# Patient Record
Sex: MALE | Race: WHITE | NOT HISPANIC OR LATINO | Employment: FULL TIME | URBAN - METROPOLITAN AREA
[De-identification: names, ages, dates, MRNs, and addresses within clinical notes are randomized per-mention and may not be internally consistent; named-entity substitution may affect disease eponyms.]

---

## 2023-09-16 ENCOUNTER — APPOINTMENT (OUTPATIENT)
Dept: PRIMARY CARE | Facility: CLINIC | Age: 38
End: 2023-09-16
Payer: COMMERCIAL

## 2023-09-18 ENCOUNTER — OFFICE VISIT (OUTPATIENT)
Dept: PRIMARY CARE | Facility: CLINIC | Age: 38
End: 2023-09-18
Payer: COMMERCIAL

## 2023-09-18 ENCOUNTER — APPOINTMENT (OUTPATIENT)
Dept: PRIMARY CARE | Facility: CLINIC | Age: 38
End: 2023-09-18
Payer: COMMERCIAL

## 2023-09-18 VITALS
OXYGEN SATURATION: 96 % | BODY MASS INDEX: 44.1 KG/M2 | HEART RATE: 88 BPM | HEIGHT: 71 IN | WEIGHT: 315 LBS | SYSTOLIC BLOOD PRESSURE: 140 MMHG | TEMPERATURE: 97 F | DIASTOLIC BLOOD PRESSURE: 99 MMHG

## 2023-09-18 DIAGNOSIS — I10 BENIGN ESSENTIAL HYPERTENSION: ICD-10-CM

## 2023-09-18 PROCEDURE — 99203 OFFICE O/P NEW LOW 30 MIN: CPT | Performed by: NURSE PRACTITIONER

## 2023-09-18 PROCEDURE — 3080F DIAST BP >= 90 MM HG: CPT | Performed by: NURSE PRACTITIONER

## 2023-09-18 PROCEDURE — 1036F TOBACCO NON-USER: CPT | Performed by: NURSE PRACTITIONER

## 2023-09-18 PROCEDURE — 3077F SYST BP >= 140 MM HG: CPT | Performed by: NURSE PRACTITIONER

## 2023-09-18 RX ORDER — HYDROCHLOROTHIAZIDE 12.5 MG/1
25 TABLET ORAL DAILY
COMMUNITY
Start: 2023-09-09 | End: 2023-10-02 | Stop reason: DRUGHIGH

## 2023-09-18 RX ORDER — LISINOPRIL 10 MG/1
20 TABLET ORAL 2 TIMES DAILY
COMMUNITY
Start: 2023-08-25 | End: 2023-10-02 | Stop reason: DRUGHIGH

## 2023-09-18 ASSESSMENT — ENCOUNTER SYMPTOMS
SORE THROAT: 1
PALPITATIONS: 0
ARTHRALGIAS: 0
NERVOUS/ANXIOUS: 0
EYE PAIN: 0
NAUSEA: 0
HEMATURIA: 0
BACK PAIN: 0
PHOTOPHOBIA: 0
CHILLS: 0
MYALGIAS: 0
UNEXPECTED WEIGHT CHANGE: 0
FEVER: 0
VOMITING: 0
EYE REDNESS: 0
CHEST TIGHTNESS: 0
WHEEZING: 0
CONSTIPATION: 0
EYE ITCHING: 0
DYSURIA: 0
EYE DISCHARGE: 0
ADENOPATHY: 0
WEAKNESS: 0
BRUISES/BLEEDS EASILY: 0
RHINORRHEA: 0
DIARRHEA: 0
SINUS PRESSURE: 0
HEADACHES: 1
DIZZINESS: 0
COUGH: 0
POLYPHAGIA: 0
SLEEP DISTURBANCE: 0
BLOOD IN STOOL: 0
DIFFICULTY URINATING: 0
FATIGUE: 0
SPEECH DIFFICULTY: 0
SHORTNESS OF BREATH: 0
NUMBNESS: 0
DYSPHORIC MOOD: 0
POLYDIPSIA: 0
FREQUENCY: 0
NECK PAIN: 0
HYPERTENSION: 1
ABDOMINAL PAIN: 0

## 2023-09-18 NOTE — PROGRESS NOTES
Subjective   Ilya Olivas is a 37 y.o. male who presents for Hypertension (Talk about high BP ), Sinusitis (Chronic sinus drainage discuss med...used OTC and not working), and Flu Vaccine (Patient asked/declined).    Hypertension  Associated symptoms include headaches. Pertinent negatives include no chest pain, neck pain, palpitations or shortness of breath.   Sinusitis  Associated symptoms include headaches and a sore throat. Pertinent negatives include no chills, congestion, coughing, ear pain, neck pain, shortness of breath or sinus pressure.     He presents to the office today to establish care and follow up BP.   He reports he can feel a tightness in chest and feels heart beating in neck when BP elevated.  No chest pain, palpitations or edema.  (+)SOB when walking around when first started the BP medication. This is getting better now that he is getting used to the medication  Drank 1 energy drink a day prior to ER visit.  Tolerating medications well at current dose- no side effects. Increased urination with increased dose of hydrochlorothiazide.  No headaches, numbness, tingling, weakness or vision changes.  No dizziness.  BP readings in the /117  Had a workup in the ER. Then has been going to the [facility] who has been titrating medication. 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  Eats a lot of hot dogs to snack on throughout the day.  Exercise: No scheduled exercise.     Had DOT physical in a month and needs to get his BP down by then.    Review of Systems   Constitutional:  Negative for chills, fatigue, fever and unexpected weight change.   HENT:  Positive for sore throat and tinnitus. Negative for congestion, ear pain, hearing loss, nosebleeds, postnasal drip, rhinorrhea and sinus pressure.    Eyes:  Negative for photophobia, pain, discharge, redness, itching and visual disturbance.   Respiratory:  Negative for cough, chest tightness,  "shortness of breath and wheezing.    Cardiovascular:  Negative for chest pain, palpitations and leg swelling.   Gastrointestinal:  Negative for abdominal pain, blood in stool, constipation, diarrhea, nausea and vomiting.   Endocrine: Negative for cold intolerance, heat intolerance, polydipsia, polyphagia and polyuria.   Genitourinary:  Negative for difficulty urinating, dysuria, frequency, hematuria and urgency.   Musculoskeletal:  Negative for arthralgias, back pain, myalgias and neck pain.   Skin:  Negative for rash.   Neurological:  Positive for headaches. Negative for dizziness, syncope, speech difficulty, weakness and numbness.   Hematological:  Negative for adenopathy. Does not bruise/bleed easily.   Psychiatric/Behavioral:  Negative for dysphoric mood and sleep disturbance. The patient is not nervous/anxious.      Objective   BP (!) 140/99   Pulse 88   Temp 36.1 °C (97 °F)   Ht 1.791 m (5' 10.5\")   Wt (!) 189 kg (416 lb 3.2 oz)   SpO2 96%   BMI 58.87 kg/m²     Physical Exam  Constitutional:       General: He is not in acute distress.     Appearance: Normal appearance. He is not toxic-appearing.   Eyes:      Extraocular Movements: Extraocular movements intact.      Conjunctiva/sclera: Conjunctivae normal.      Pupils: Pupils are equal, round, and reactive to light.   Neck:      Vascular: No carotid bruit.   Cardiovascular:      Rate and Rhythm: Normal rate and regular rhythm.      Pulses: Normal pulses.      Heart sounds: Normal heart sounds, S1 normal and S2 normal. No murmur heard.  Pulmonary:      Effort: Pulmonary effort is normal. No respiratory distress.      Breath sounds: Normal breath sounds.   Abdominal:      General: Bowel sounds are normal.      Palpations: Abdomen is soft.      Tenderness: There is no abdominal tenderness.   Musculoskeletal:      Right lower leg: No edema.      Left lower leg: No edema.   Lymphadenopathy:      Cervical: No cervical adenopathy.   Neurological:      Mental " Status: He is alert and oriented to person, place, and time.   Psychiatric:         Attention and Perception: Attention normal.         Mood and Affect: Mood and affect normal.         Behavior: Behavior normal. Behavior is cooperative.         Thought Content: Thought content normal.         Cognition and Memory: Cognition normal.         Judgment: Judgment normal.       Assessment/Plan   Problem List Items Addressed This Visit       Benign essential hypertension     Given he recently doubled the dose of both medications, will continue with current dose for now. Plan to recheck a BMP and follow up in 2 weeks to add medication if needed.         Relevant Orders    Basic Metabolic Panel       It has been a pleasure seeing you today!

## 2023-09-18 NOTE — PATIENT INSTRUCTIONS
Focus on a low sodium/low fat diet (whole grains, fresh fruits and vegetables, lean meats). Avoid foods high in sugar.  Increase exercise as tolerated.  Continue medications at the current dose.  Send my BP readings through [employer] at the end of the week.   Check blood work this weekend when home.  Follow up as planned in 2 weeks.

## 2023-09-20 NOTE — ASSESSMENT & PLAN NOTE
Given he recently doubled the dose of both medications, will continue with current dose for now. Plan to recheck a BMP and follow up in 2 weeks to add medication if needed.  Discussed focusing on a low sodium diet.

## 2023-09-23 ENCOUNTER — LAB (OUTPATIENT)
Dept: LAB | Facility: LAB | Age: 38
End: 2023-09-23
Payer: COMMERCIAL

## 2023-09-23 DIAGNOSIS — I10 BENIGN ESSENTIAL HYPERTENSION: ICD-10-CM

## 2023-09-23 LAB
ANION GAP IN SER/PLAS: 11 MMOL/L (ref 10–20)
CALCIUM (MG/DL) IN SER/PLAS: 8.8 MG/DL (ref 8.6–10.3)
CARBON DIOXIDE, TOTAL (MMOL/L) IN SER/PLAS: 26 MMOL/L (ref 21–32)
CHLORIDE (MMOL/L) IN SER/PLAS: 105 MMOL/L (ref 98–107)
CREATININE (MG/DL) IN SER/PLAS: 1.05 MG/DL (ref 0.5–1.3)
GFR MALE: >90 ML/MIN/1.73M2
GLUCOSE (MG/DL) IN SER/PLAS: 85 MG/DL (ref 74–99)
POTASSIUM (MMOL/L) IN SER/PLAS: 4.4 MMOL/L (ref 3.5–5.3)
SODIUM (MMOL/L) IN SER/PLAS: 138 MMOL/L (ref 136–145)
UREA NITROGEN (MG/DL) IN SER/PLAS: 16 MG/DL (ref 6–23)

## 2023-09-23 PROCEDURE — 36415 COLL VENOUS BLD VENIPUNCTURE: CPT

## 2023-09-23 PROCEDURE — 80048 BASIC METABOLIC PNL TOTAL CA: CPT

## 2023-10-02 ENCOUNTER — OFFICE VISIT (OUTPATIENT)
Dept: PRIMARY CARE | Facility: CLINIC | Age: 38
End: 2023-10-02
Payer: COMMERCIAL

## 2023-10-02 VITALS
HEART RATE: 85 BPM | BODY MASS INDEX: 58.73 KG/M2 | WEIGHT: 315 LBS | SYSTOLIC BLOOD PRESSURE: 120 MMHG | DIASTOLIC BLOOD PRESSURE: 86 MMHG | TEMPERATURE: 97.9 F | OXYGEN SATURATION: 96 %

## 2023-10-02 DIAGNOSIS — I10 BENIGN ESSENTIAL HYPERTENSION: Primary | ICD-10-CM

## 2023-10-02 PROCEDURE — 99213 OFFICE O/P EST LOW 20 MIN: CPT | Performed by: NURSE PRACTITIONER

## 2023-10-02 PROCEDURE — 3074F SYST BP LT 130 MM HG: CPT | Performed by: NURSE PRACTITIONER

## 2023-10-02 PROCEDURE — 3079F DIAST BP 80-89 MM HG: CPT | Performed by: NURSE PRACTITIONER

## 2023-10-02 PROCEDURE — 1036F TOBACCO NON-USER: CPT | Performed by: NURSE PRACTITIONER

## 2023-10-02 RX ORDER — AMOXICILLIN AND CLAVULANATE POTASSIUM 875; 125 MG/1; MG/1
1 TABLET, FILM COATED ORAL 2 TIMES DAILY
COMMUNITY
Start: 2023-09-24 | End: 2023-10-04

## 2023-10-02 RX ORDER — HYDROCHLOROTHIAZIDE 25 MG/1
25 TABLET ORAL DAILY
Qty: 90 TABLET | Refills: 0 | Status: SHIPPED | OUTPATIENT
Start: 2023-10-02 | End: 2024-01-15 | Stop reason: ALTCHOICE

## 2023-10-02 RX ORDER — LISINOPRIL 20 MG/1
20 TABLET ORAL 2 TIMES DAILY
Qty: 180 TABLET | Refills: 0 | Status: SHIPPED | OUTPATIENT
Start: 2023-10-02 | End: 2023-12-22 | Stop reason: SDUPTHER

## 2023-10-02 ASSESSMENT — ENCOUNTER SYMPTOMS
ORTHOPNEA: 0
SHORTNESS OF BREATH: 0
WEAKNESS: 0
COUGH: 0
FEVER: 0
DIZZINESS: 0
CHEST TIGHTNESS: 0
PALPITATIONS: 0
PND: 0
HYPERTENSION: 1
CHILLS: 0
FATIGUE: 0
HEADACHES: 0
BLURRED VISION: 0
NECK PAIN: 0
DIARRHEA: 1
SWEATS: 0
NUMBNESS: 0

## 2023-10-02 ASSESSMENT — PATIENT HEALTH QUESTIONNAIRE - PHQ9
1. LITTLE INTEREST OR PLEASURE IN DOING THINGS: NOT AT ALL
2. FEELING DOWN, DEPRESSED OR HOPELESS: NOT AT ALL
SUM OF ALL RESPONSES TO PHQ9 QUESTIONS 1 AND 2: 0

## 2023-10-02 NOTE — PATIENT INSTRUCTIONS
Continue medications at the current dose. Move your second dose of Lisinopril to the evening.  Continue to focus on a low sodium diet.  Plan to follow up in 3 months or sooner if needed.

## 2023-10-02 NOTE — PROGRESS NOTES
Subjective   Ilya Olivas is a 37 y.o. male who presents for Hypertension (Follow up) and Med Refill.    Hypertension  This is a chronic problem. The current episode started more than 1 year ago. The problem has been gradually improving since onset. The problem is resistant. Pertinent negatives include no anxiety, blurred vision, chest pain, headaches, malaise/fatigue, neck pain, orthopnea, palpitations, peripheral edema, PND, shortness of breath or sweats. There are no associated agents to hypertension. Risk factors for coronary artery disease include family history, obesity and stress. Compliance problems include diet and exercise.      He presents to the office today for a blood pressure check.  He is tolerating medications well at current dose- no side effects. No chest pain, shortness of breath, palpitations or edema. Feels aching in chest when BP shoots up like when in the ER (this is short lived now). Initially had SOB with starting BP medication-this is better. No headaches, numbness, tingling, weakness or vision changes.  No dizziness.  Home blood pressure readings: Overall much better but still has occasional spikes into the 140's/90's mainly in the evenings.  He has been using his watch for his BP    Recheck in office today on lower arm was 140-150/90  Diet: Overall eating healthier- paying attention to sodium in foods now.  Exercise: getting 2000 steps a day. Most days gets over 5000 steps a day.    Has occasional episodes of anxiety and depression. These last a few hours and are generally situation.   No SI or HI.    Last labs:   Lab Results   Component Value Date    GLUCOSE 85 09/23/2023    CALCIUM 8.8 09/23/2023     09/23/2023    K 4.4 09/23/2023    CO2 26 09/23/2023     09/23/2023    BUN 16 09/23/2023    CREATININE 1.05 09/23/2023      Review of Systems   Constitutional:  Negative for chills, fatigue, fever and malaise/fatigue.   Eyes:  Negative for blurred vision and visual  disturbance.   Respiratory:  Negative for cough, chest tightness and shortness of breath.    Cardiovascular:  Negative for chest pain, palpitations, orthopnea, leg swelling and PND.   Gastrointestinal:  Positive for diarrhea.   Musculoskeletal:  Negative for neck pain.   Neurological:  Negative for dizziness, weakness, numbness and headaches.     Objective   /86 (BP Location: Right arm, Patient Position: Sitting, BP Cuff Size: Large adult)   Pulse 85   Temp 36.6 °C (97.9 °F) (Temporal)   Wt (!) 188 kg (415 lb 3.2 oz)   SpO2 96%   BMI 58.73 kg/m²     Physical Exam  Constitutional:       General: He is not in acute distress.     Appearance: Normal appearance. He is not toxic-appearing.   Eyes:      Extraocular Movements: Extraocular movements intact.      Conjunctiva/sclera: Conjunctivae normal.      Pupils: Pupils are equal, round, and reactive to light.   Cardiovascular:      Rate and Rhythm: Normal rate and regular rhythm.      Pulses: Normal pulses.      Heart sounds: Normal heart sounds, S1 normal and S2 normal.   Musculoskeletal:      Right lower leg: No edema.      Left lower leg: No edema.   Neurological:      Mental Status: He is alert and oriented to person, place, and time.   Psychiatric:         Mood and Affect: Mood normal.         Behavior: Behavior normal.         Thought Content: Thought content normal.         Judgment: Judgment normal.       Assessment/Plan   Problem List Items Addressed This Visit       Benign essential hypertension - Primary     Will change time of second Lisinopril dose prior to adding another medication.         Relevant Medications    lisinopril 20 mg tablet    hydroCHLOROthiazide (HYDRODiuril) 25 mg tablet       It has been a pleasure seeing you today!

## 2023-11-01 ENCOUNTER — TELEPHONE (OUTPATIENT)
Dept: PRIMARY CARE | Facility: CLINIC | Age: 38
End: 2023-11-01
Payer: COMMERCIAL

## 2023-11-01 NOTE — TELEPHONE ENCOUNTER
PT was trying to send a message through [employer] but was unable to.  He would like to know if he is to stop taking his BP medication hydrochlorothiazide - please advise

## 2023-11-02 DIAGNOSIS — I10 BENIGN ESSENTIAL HYPERTENSION: Primary | ICD-10-CM

## 2023-11-02 NOTE — TELEPHONE ENCOUNTER
Pt said the main reason he wanted to know if he could stop taking the hydrochlorothiazide because before taking the pill he was drinking 1 gallon of water but now that he started taking it he has been drinking 2 gallon of water a day, he stated he is going to the restroom much more.  Please advise.

## 2023-11-03 ENCOUNTER — LAB (OUTPATIENT)
Dept: LAB | Facility: LAB | Age: 38
End: 2023-11-03
Payer: COMMERCIAL

## 2023-11-03 DIAGNOSIS — I10 BENIGN ESSENTIAL HYPERTENSION: ICD-10-CM

## 2023-11-03 LAB
ANION GAP SERPL CALC-SCNC: 11 MMOL/L (ref 10–20)
BUN SERPL-MCNC: 13 MG/DL (ref 6–23)
CALCIUM SERPL-MCNC: 9.4 MG/DL (ref 8.6–10.3)
CHLORIDE SERPL-SCNC: 103 MMOL/L (ref 98–107)
CO2 SERPL-SCNC: 27 MMOL/L (ref 21–32)
CREAT SERPL-MCNC: 0.85 MG/DL (ref 0.5–1.3)
GFR SERPL CREATININE-BSD FRML MDRD: >90 ML/MIN/1.73M*2
GLUCOSE SERPL-MCNC: 95 MG/DL (ref 74–99)
POTASSIUM SERPL-SCNC: 4 MMOL/L (ref 3.5–5.3)
SODIUM SERPL-SCNC: 137 MMOL/L (ref 136–145)

## 2023-11-03 PROCEDURE — 80048 BASIC METABOLIC PNL TOTAL CA: CPT

## 2023-11-03 PROCEDURE — 36415 COLL VENOUS BLD VENIPUNCTURE: CPT

## 2023-11-06 DIAGNOSIS — I10 BENIGN ESSENTIAL HYPERTENSION: Primary | ICD-10-CM

## 2023-11-06 RX ORDER — AMLODIPINE BESYLATE 2.5 MG/1
2.5 TABLET ORAL DAILY
Qty: 30 TABLET | Refills: 0 | Status: SHIPPED | OUTPATIENT
Start: 2023-11-06 | End: 2023-11-28

## 2023-11-06 NOTE — TELEPHONE ENCOUNTER
Talked with pt about results. He stated he is going to continue taking hydrochlorothiazide because he would not be able to  the amlodipine and lisinopril till this upcoming weekend. [facility] in [place] is a good pharmacy to send the medication.

## 2023-11-06 NOTE — TELEPHONE ENCOUNTER
----- Message from ASHLEY Pisano-CNP sent at 11/4/2023  7:55 AM EDT -----  Please let him know that his blood work looked great. We can stop the hydrochlorothiazide and switch to Amlodipine along with the Lisinopril due to increased urination. I will send in new medication if he is agreeable.

## 2023-11-24 DIAGNOSIS — I10 BENIGN ESSENTIAL HYPERTENSION: ICD-10-CM

## 2023-11-28 RX ORDER — AMLODIPINE BESYLATE 2.5 MG/1
2.5 TABLET ORAL DAILY
Qty: 90 TABLET | Refills: 0 | Status: SHIPPED | OUTPATIENT
Start: 2023-11-28 | End: 2024-02-26

## 2023-12-22 DIAGNOSIS — I10 BENIGN ESSENTIAL HYPERTENSION: ICD-10-CM

## 2023-12-22 RX ORDER — LISINOPRIL 20 MG/1
20 TABLET ORAL 2 TIMES DAILY
Qty: 180 TABLET | Refills: 0 | Status: SHIPPED | OUTPATIENT
Start: 2023-12-22 | End: 2024-03-22 | Stop reason: SDUPTHER

## 2023-12-26 ENCOUNTER — TELEPHONE (OUTPATIENT)
Dept: PRIMARY CARE | Facility: CLINIC | Age: 38
End: 2023-12-26
Payer: COMMERCIAL

## 2023-12-26 NOTE — TELEPHONE ENCOUNTER
"----- Message from MARY Pisano sent at 12/26/2023  2:46 PM EST -----  Regarding: FW: Intolerance test results   Contact: [phone]  Let’s get him set up with an office visit. He’s due for follow-up of blood pressure. We can discuss at the visit. Thank you.  ----- Message -----  From: Julissa Stafford CMA  Sent: 12/26/2023   2:41 PM EST  To: MARY Pisano  Subject: FW: Intolerance test results                       ----- Message -----  From: Ronal Olivas \"Ilya\"  Sent: 12/26/2023  12:26 PM EST  To:  [id number] [id number] Clinical Support Staff  Subject: Intolerance test results                         In the vitamin minerals section it says omega 3 but that's good for lots of things. Should I take extra to offset the intolerance or avoid taking any    "

## 2024-01-15 ENCOUNTER — OFFICE VISIT (OUTPATIENT)
Dept: PRIMARY CARE | Facility: CLINIC | Age: 39
End: 2024-01-15
Payer: COMMERCIAL

## 2024-01-15 VITALS
WEIGHT: 315 LBS | BODY MASS INDEX: 60.29 KG/M2 | TEMPERATURE: 97.3 F | SYSTOLIC BLOOD PRESSURE: 137 MMHG | DIASTOLIC BLOOD PRESSURE: 82 MMHG | OXYGEN SATURATION: 95 % | HEART RATE: 101 BPM

## 2024-01-15 DIAGNOSIS — I10 BENIGN ESSENTIAL HYPERTENSION: Primary | ICD-10-CM

## 2024-01-15 DIAGNOSIS — I51.7 CARDIOMEGALY: ICD-10-CM

## 2024-01-15 DIAGNOSIS — E66.01 MORBID OBESITY (MULTI): ICD-10-CM

## 2024-01-15 PROCEDURE — 3075F SYST BP GE 130 - 139MM HG: CPT | Performed by: NURSE PRACTITIONER

## 2024-01-15 PROCEDURE — 99213 OFFICE O/P EST LOW 20 MIN: CPT | Performed by: NURSE PRACTITIONER

## 2024-01-15 PROCEDURE — 3079F DIAST BP 80-89 MM HG: CPT | Performed by: NURSE PRACTITIONER

## 2024-01-15 PROCEDURE — 1036F TOBACCO NON-USER: CPT | Performed by: NURSE PRACTITIONER

## 2024-01-15 ASSESSMENT — ENCOUNTER SYMPTOMS
FATIGUE: 0
CHEST TIGHTNESS: 0
WEAKNESS: 0
DIARRHEA: 0
NUMBNESS: 0
COUGH: 0
HEADACHES: 0
ABDOMINAL PAIN: 0
PALPITATIONS: 0
CHILLS: 0
SHORTNESS OF BREATH: 0
VOMITING: 0
DIZZINESS: 0
FEVER: 0
NAUSEA: 0

## 2024-01-15 ASSESSMENT — PATIENT HEALTH QUESTIONNAIRE - PHQ9
SUM OF ALL RESPONSES TO PHQ9 QUESTIONS 1 AND 2: 0
1. LITTLE INTEREST OR PLEASURE IN DOING THINGS: NOT AT ALL
2. FEELING DOWN, DEPRESSED OR HOPELESS: NOT AT ALL

## 2024-01-15 NOTE — PROGRESS NOTES
"Santos Olivas \"Ilya\" is a 38 y.o. male who presents for Blood Pressure Check, Medication Question (Discuss intolerance test for omega 3), and Cold Feet (When laying in bed, sometimes will be extremely cold).    HPI  He presents to the office today for medication refills and follow up  He is tolerating medications well at current dose- no side effects.  He had to stop the hydrochlorothiazide because it was making him urinate a lot and very thirsty.  No chest pain, palpitations or edema. Occasionally feels when he takes a deep breath he is not getting enough air in. This was more frequent when first started taking the Amlodipine and much less now. No headaches, numbness, tingling, weakness or vision changes.  No dizziness.  Home blood pressure readings.120-145/80-95  HR 64-97  Diet: Recently changed. Cut back on portions. 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  They are not itchy.  He gets little bumps that then peel.  He has tried an antifungal without any resolution.    Review of Systems   Constitutional:  Negative for chills, fatigue and fever.   Eyes:  Negative for visual disturbance.   Respiratory:  Negative for cough, chest tightness and shortness of breath.    Cardiovascular:  Negative for chest pain, palpitations and leg swelling.   Gastrointestinal:  Negative for abdominal pain, diarrhea, nausea and vomiting.   Neurological:  Negative for dizziness, weakness, numbness and headaches.     Objective   /82 (BP Location: Left arm, Patient Position: Sitting) Comment: electronic reading  Pulse 101   Temp 36.3 °C (97.3 °F) (Temporal)   Wt (!) 193 kg (426 lb 3.2 oz)   SpO2 95%   BMI 60.29 kg/m²     Physical Exam  Constitutional:       General: He is not in " acute distress.     Appearance: Normal appearance. He is not toxic-appearing.   Eyes:      Extraocular Movements: Extraocular movements intact.      Conjunctiva/sclera: Conjunctivae normal.      Pupils: Pupils are equal, round, and reactive to light.   Neck:      Vascular: No carotid bruit.   Cardiovascular:      Rate and Rhythm: Normal rate and regular rhythm.      Pulses: Normal pulses.      Heart sounds: Normal heart sounds, S1 normal and S2 normal. No murmur heard.  Pulmonary:      Effort: Pulmonary effort is normal. No respiratory distress.      Breath sounds: Normal breath sounds.   Abdominal:      General: Bowel sounds are normal.      Palpations: Abdomen is soft.      Tenderness: There is no abdominal tenderness.   Musculoskeletal:      Right lower leg: No edema.      Left lower leg: No edema.   Feet:      Comments: Feet are dry with small circular peeling areas.  Lymphadenopathy:      Cervical: No cervical adenopathy.   Neurological:      Mental Status: He is alert and oriented to person, place, and time.   Psychiatric:         Attention and Perception: Attention normal.         Mood and Affect: Mood and affect normal.         Behavior: Behavior normal. Behavior is cooperative.         Thought Content: Thought content normal.         Cognition and Memory: Cognition normal.         Judgment: Judgment normal.       Assessment/Plan   Problem List Items Addressed This Visit       Benign essential hypertension - Primary     Better overall. Still not consistently at goal. Check ECHO and will adjust medication on review.         Relevant Orders    Transthoracic Echo (TTE) Complete    Lipid Panel    Morbid obesity (CMS/HCC)     Discussed dietary modifications today. Also discussed a bariatric referral- he reports he does not have the time off work to do bariatric program.         Cardiomegaly     Check ECHO.         Relevant Orders    Transthoracic Echo (TTE) Complete       It has been a pleasure seeing you  today!

## 2024-01-15 NOTE — ASSESSMENT & PLAN NOTE
Discussed dietary modifications today. Also discussed a bariatric referral- he reports he does not have the time off work to do bariatric program.

## 2024-01-15 NOTE — PATIENT INSTRUCTIONS
Check fasting lipid panel as ordered.  ECHO ordered today.  Continue medications at the current dose for now.

## 2024-01-20 ENCOUNTER — LAB (OUTPATIENT)
Dept: LAB | Facility: LAB | Age: 39
End: 2024-01-20
Payer: COMMERCIAL

## 2024-01-20 DIAGNOSIS — I10 BENIGN ESSENTIAL HYPERTENSION: ICD-10-CM

## 2024-01-20 LAB
CHOLEST SERPL-MCNC: 159 MG/DL (ref 0–199)
CHOLESTEROL/HDL RATIO: 3.9
HDLC SERPL-MCNC: 41.1 MG/DL
LDLC SERPL CALC-MCNC: 102 MG/DL
NON HDL CHOLESTEROL: 118 MG/DL (ref 0–149)
TRIGL SERPL-MCNC: 78 MG/DL (ref 0–149)
VLDL: 16 MG/DL (ref 0–40)

## 2024-01-20 PROCEDURE — 36415 COLL VENOUS BLD VENIPUNCTURE: CPT

## 2024-01-20 PROCEDURE — 80061 LIPID PANEL: CPT

## 2024-01-26 DIAGNOSIS — E66.01 MORBID OBESITY (MULTI): Primary | ICD-10-CM

## 2024-01-29 ENCOUNTER — HOSPITAL ENCOUNTER (OUTPATIENT)
Dept: CARDIOLOGY | Facility: HOSPITAL | Age: 39
Discharge: HOME | End: 2024-01-29
Payer: COMMERCIAL

## 2024-01-29 DIAGNOSIS — I51.7 CARDIOMEGALY: ICD-10-CM

## 2024-01-29 DIAGNOSIS — I10 BENIGN ESSENTIAL HYPERTENSION: ICD-10-CM

## 2024-01-29 PROCEDURE — 2500000004 HC RX 250 GENERAL PHARMACY W/ HCPCS (ALT 636 FOR OP/ED): Performed by: INTERNAL MEDICINE

## 2024-01-29 PROCEDURE — 93306 TTE W/DOPPLER COMPLETE: CPT

## 2024-01-29 PROCEDURE — 93306 TTE W/DOPPLER COMPLETE: CPT | Performed by: INTERNAL MEDICINE

## 2024-01-29 RX ADMIN — HUMAN ALBUMIN MICROSPHERES AND PERFLUTREN 0.5 ML: 10; .22 INJECTION, SOLUTION INTRAVENOUS at 07:51

## 2024-01-30 LAB
EJECTION FRACTION APICAL 4 CHAMBER: 65.8
EJECTION FRACTION: 65 %
LEFT ATRIUM VOLUME AREA LENGTH INDEX BSA: 42.1 ML/M2
LEFT VENTRICLE INTERNAL DIMENSION DIASTOLE: 5.14 CM (ref 3.5–6)
LEFT VENTRICULAR OUTFLOW TRACT DIAMETER: 2.01 CM
MITRAL VALVE E/A RATIO: 1.33
MITRAL VALVE E/E' RATIO: 6.91
RIGHT VENTRICLE FREE WALL PEAK S': 17.2 CM/S
TRICUSPID ANNULAR PLANE SYSTOLIC EXCURSION: 2.7 CM

## 2024-02-01 DIAGNOSIS — I51.7 LVH (LEFT VENTRICULAR HYPERTROPHY): ICD-10-CM

## 2024-02-01 DIAGNOSIS — I51.7 RIGHT VENTRICULAR DILATION: Primary | ICD-10-CM

## 2024-02-05 ENCOUNTER — APPOINTMENT (OUTPATIENT)
Dept: CARDIOLOGY | Facility: CLINIC | Age: 39
End: 2024-02-05
Payer: COMMERCIAL

## 2024-03-13 PROBLEM — I51.7 LVH (LEFT VENTRICULAR HYPERTROPHY): Status: ACTIVE | Noted: 2024-03-13

## 2024-03-13 PROBLEM — I51.7 RIGHT VENTRICULAR DILATION: Status: ACTIVE | Noted: 2024-03-13

## 2024-03-13 NOTE — PROGRESS NOTES
[facility]    Patient Name: Ronal Olivas  Patient : 1985      Scribe Attestation  By signing my name below, IBibiana Scribe   attest that this documentation has been prepared under the direction and in the presence of Kanu Quarles DO.      Reason for visit:  This is a 38-year-old male here for evaluation regarding right ventricular dilatation, LVH, hypertension, and morbid obesity.      HPI:  This is a 38-year-old male here for evaluation regarding right ventricular dilatation, LVH, hypertension, and morbid obesity.  Patient has never been evaluated by [facility] in the past.  BMP done in 2023 showed normal serum sodium and potassium with a serum creatinine 0.85.  Lipid panel done in 2024 showed an LDL cholesterol 102 and triglycerides of 78 not currently on any lipid-lowering medical therapy.  TSH done in 2023 was 1.02, CBC showed hemoglobin of 15.8.  Chest x-ray done in 2023 showed cardiomegaly without evidence of acute intrathoracic abnormality.  Echocardiogram done in 2024 showed normal left ventricular systolic function with an ejection fraction of 60 to 65%, mild septal LVH, dilated right ventricle with normal right ventricular systolic function, and no significant valve abnormalities. ECG done today showed sinus rhythm with a heart rate of 87 bpm.  The patient reports that he has been feeling generally well from the cardiac standpoint. He denies any new chest pain, shortness of breath, palpitations and lightheadedness. He states that his usual blood pressure at home is in the 120-130s. He states that he parked a bit far from the clinic and his blood pressure elevation could be due to hurrying to meet his appointment. He states that he does not smoke cigarettes. He states that he takes all of his medications as prescribed. During my exam, he was resting comfortably on the exam table.             Assessment/Plan:   Right ventricular dilatation  Echocardiogram done in January 2024 showed normal left ventricular systolic function with an ejection fraction of 60 to 65%, mild septal LVH, dilated right ventricle with normal right ventricular systolic function, and no significant valve abnormalities.  Chest x-ray done in August 2023 showed cardiomegaly without evidence of acute intrathoracic abnormality.   ECG done today showed sinus rhythm with a heart rate of 87 bpm.  He denies chest pain, shortness of breath, palpitations or lightheadedness.   He does have trace to 1+ pitting lower extremity edema on exam today.   He should continue current cardiac medications.  Recent lab works as noted in the HPI.   I discussed with him the importance of following a low-sodium heart healthy diet as well as weight loss.  Sleep study recommended but patient declined.  Follow up in 6 weeks and sooner if necessary.     LVH  Echocardiogram done in January 2024 showed normal left ventricular systolic function with an ejection fraction of 60 to 65%, mild septal LVH, dilated right ventricle with normal right ventricular systolic function, and no significant valve abnormalities.  Chest x-ray done in August 2023 showed cardiomegaly without evidence of acute intrathoracic abnormality.   ECG done today showed sinus rhythm with a heart rate of 87 bpm.  He denies chest pain, shortness of breath, palpitations or lightheadedness.   Blood pressure appears poorly controlled on exam today.  He states that his usual blood pressure at home is in the 120-130s.   He should continue current antihypertensive medications.  Recent lab works as noted in the HPI.  Coronary artery calcium scoring was ordered as noted below.  Continue risk factor modification.  Follow up in 6 weeks and sooner if necessary.     3. Hypertension  The patient has a history of hypertension which appears poorly controlled on exam today.  He states that his usual blood  pressure at home is in the 120-130s.   He should continue his current antihypertensive medications.and monitor his blood pressure at home.  He should call the clinic if his blood pressure is persistently elevated.    4. Morbid obesity  Sleep study was suggested to evaluate for possible sleep apnea but he declined at this time.  Please see lifestyle recommendations below.    5. Lower extremity edema  The patient has trace to 1+ pitting bilateral lower extremity edema on exam today.  I discussed with him the importance of following a low-sodium heart healthy diet as well as weight loss, wearing compression stockings and elevating legs when seated.      Orders:   Sleep study suggested -patient declined   Coronary calcium score,   Follow-up in 6 weeks.    Lifestyle Recommendations  I recommend a whole-food plant-based diet, an eating pattern that encourages the consumption of unrefined plant foods (such as fruits, vegetables, tubers, whole grains, legumes, nuts and seeds) and discourages meats, dairy products, eggs and processed foods.     The AHA/ACC recommends that the patient consume a dietary pattern that emphasizes intake of vegetables, fruits, and whole grains; includes low-fat dairy products, poultry, fish, legumes, non-tropical vegetable oils, and nuts; and limits intake of sodium, sweets, sugar-sweetened beverages, and red meats.  Adapt this dietary pattern to appropriate calorie requirements (a 500-750 kcal/day deficit to loose weight), personal and cultural food preferences, and nutrition therapy for other medical conditions (including diabetes).  Achieve this pattern by following plans such as the [place] Mediterranean, DASH dietary pattern, or AHA diet.     Engage in 2 hours and 30 minutes per week of moderate-intensity physical activity, or 1 hour and 15 minutes (75 minutes) per week of vigorous-intensity aerobic physical activity, or an equivalent combination of moderate and vigorous-intensity aerobic  physical activity. Aerobic activity should be performed in episodes of at least 10 minutes preferably spread throughout the week.     Adhering to a heart healthy diet, regular exercise habits, avoidance of tobacco products, and maintenance of a healthy weight are crucial components of their heart disease risk reduction.     Any positive review of systems not specifically addressed in the office visit today should be evaluated and treated by the patients primary care physician or in an emergency department if necessary     Patient was notified that results from ordered tests will be called to the patient if it changes current management; it will otherwise be discussed at a future appointment and available on  [employer].     Thank you for allowing me to participate in the care of this patient.        This document was generated using the assistance of voice recognition software. If there are any errors of spelling, grammar, syntax, or meaning; please feel free to contact me directly for clarification.      Past Medical History:  He has a past medical history of ADHD (attention deficit hyperactivity disorder), Headache, Hypertension, and Seizures (CMS/HCC).    Past Surgical History:  He has no past surgical history on file.      Social History:  He reports that he has never smoked. He has never been exposed to tobacco smoke. He has never used smokeless tobacco. He reports that he does not drink alcohol and does not use drugs.    Family History:  Family History   Problem Relation Name Age of Onset    No Known Problems Mother      No Known Problems Father      Hypertension Maternal Grandfather Akira         Allergies:  Shellfish containing products and Dilantin [phenytoin sodium extended]    Outpatient Medications:  Current Outpatient Medications   Medication Instructions    amLODIPine (NORVASC) 2.5 mg, oral, Daily    lisinopril 20 mg, oral, 2 times daily        ROS:  A 14 point review of systems was done and is  "negative other than as stated in HPI    Vitals:      9/18/2023     2:12 PM 10/2/2023     8:21 AM 1/15/2024     2:04 PM 1/15/2024     2:33 PM   Vitals   Systolic 140 120 137    Diastolic 99 86 82    Heart Rate 88 85 106 101   Temp 36.1 °C (97 °F) 36.6 °C (97.9 °F) 36.3 °C (97.3 °F)    Height (in) 1.791 m (5' 10.5\")      Weight (lb) 416.2 415.2 426.2    BMI 58.87 kg/m2 58.73 kg/m2 60.29 kg/m2    BSA (m2) 3.07 m2 3.06 m2 3.1 m2         Physical Exam:   Constitutional: Cooperative, in no acute distress, alert, appears stated age.  Skin: Skin color, texture, turgor normal. No rashes or lesions.  Head: Normocephalic. No masses, lesions, tenderness or abnormalities  Eyes: Extraocular movements are grossly intact.  Mouth and throat: Mucous membranes moist  Neck: Neck supple, no carotid bruits, no JVD  Respiratory: Lungs clear to auscultation, no wheezing or rhonchi, no use of accessory muscles  Chest wall: No scars, normal excursion with respiration  Cardiovascular: Regular rhythm without murmur  Gastrointestinal: Abdomen soft, nontender. Bowel sounds normal. Morbidly obese.  Musculoskeletal: Strength equal in upper extremities  Extremities: Trace to 1+ pitting edema  Neurologic: Sensation grossly intact, alert and oriented ×3    Intake/Output:   No intake/output data recorded.    Outpatient Medications  Current Outpatient Medications on File Prior to Visit   Medication Sig Dispense Refill    amLODIPine (Norvasc) 2.5 mg tablet Take 1 tablet (2.5 mg) by mouth once daily. 90 tablet 1    lisinopril 20 mg tablet TAKE 1 TABLET BY MOUTH TWICE A  tablet 0     No current facility-administered medications on file prior to visit.       Labs: (past 26 weeks)  Recent Results (from the past 4368 hour(s))   Basic Metabolic Panel    Collection Time: 09/23/23  8:08 AM   Result Value Ref Range    Glucose 85 74 - 99 mg/dL    Sodium 138 136 - 145 mmol/L    Potassium 4.4 3.5 - 5.3 mmol/L    Chloride 105 98 - 107 mmol/L    Bicarbonate 26 " 21 - 32 mmol/L    Anion Gap 11 10 - 20 mmol/L    Urea Nitrogen 16 6 - 23 mg/dL    Creatinine 1.05 0.50 - 1.30 mg/dL    GFR MALE >90 >90 mL/min/1.73m2    Calcium 8.8 8.6 - 10.3 mg/dL   Basic Metabolic Panel    Collection Time: 11/03/23  4:44 PM   Result Value Ref Range    Glucose 95 74 - 99 mg/dL    Sodium 137 136 - 145 mmol/L    Potassium 4.0 3.5 - 5.3 mmol/L    Chloride 103 98 - 107 mmol/L    Bicarbonate 27 21 - 32 mmol/L    Anion Gap 11 10 - 20 mmol/L    Urea Nitrogen 13 6 - 23 mg/dL    Creatinine 0.85 0.50 - 1.30 mg/dL    eGFR >90 >60 mL/min/1.73m*2    Calcium 9.4 8.6 - 10.3 mg/dL   Lipid Panel    Collection Time: 01/20/24  8:30 AM   Result Value Ref Range    Cholesterol 159 0 - 199 mg/dL    HDL-Cholesterol 41.1 mg/dL    Cholesterol/HDL Ratio 3.9     LDL Calculated 102 (H) <=99 mg/dL    VLDL 16 0 - 40 mg/dL    Triglycerides 78 0 - 149 mg/dL    Non HDL Cholesterol 118 0 - 149 mg/dL   Transthoracic Echo (TTE) Complete    Collection Time: 01/29/24  7:44 AM   Result Value Ref Range    LVOT diam 2.01 cm    LV biplane EF 65 %    MV E/A ratio 1.33     MV avg E/e' ratio 6.91     Tricuspid annular plane systolic excursion 2.7 cm    LA vol index A/L 42.1 ml/m2    RV free wall pk S' 17.20 cm/s    LVIDd 5.14 cm    LV A4C EF 65.8          CV Studies:  EKG: No results found for this or any previous visit (from the past 4464 hour(s)).  Echocardiogram: No results found for this or any previous visit from the past 1825 days.    Stress Testing IMGRESULT(MPO4210:1:1825): No results found for this or any previous visit from the past 1825 days.    Cardiac Catheterization: No results found for this or any previous visit from the past 1825 days.  No results found for this or any previous visit from the past 3650 days.     Cardiac Scoring: No results found for this or any previous visit from the past 1825 days.    AAA : No results found for this or any previous visit from the past 1825 days.    OTHER: No results found for this or any  previous visit from the past 1825 days.    LAST IMAGING RESULTS  Transthoracic Echo (TTE) Complete                [place] [id number]       Phone [phone] Fax [phone]    TRANSTHORACIC ECHOCARDIOGRAM REPORT       Patient Name:      TERESSA GAMBLE Reading Physician:    [id number] Kelli Woods MD  Study Date:        1/29/2024            Ordering Provider:    [id number] RADHA MIRAMONTES  MRN/PID:           [id number]             Fellow:  Accession#:        [id number]         Nurse:                Lyudmila Puente RN  Date of Birth/Age: 1985 / 38 years Sonographer:          Danica Doan RDCS  Gender:            M                    Additional Staff:  Height:            177.80 cm            Admit Date:           1/29/2024  Weight:            193.23 kg            Admission Status:     Outpatient  BSA:               2.88 m2              Department Location:  [facility] Echo                                                                Lab  Blood Pressure: 137 /82 mmHg    Study Type:    TRANSTHORACIC ECHO (TTE) COMPLETE  Diagnosis/ICD: Essential (primary) hypertension-I10; Cardiomegaly-I51.7  Indication:    Cardiomegaly, Hypertension  CPT Codes:     Echo Complete w Full Doppler-69086   Study Detail: The following Echo studies were performed: 2D, M-Mode, Doppler and                color flow. Technically challenging study due to body habitus.                Optison used as a contrast agent for endocardial border                definition. Total contrast used for this procedure was 3 mL via IV                push.       PHYSICIAN INTERPRETATION:  Left Ventricle: Left ventricular systolic function is normal, with an estimated ejection fraction of 60-65%. There are no regional wall motion abnormalities. The left ventricular cavity size is  normal. The left ventricular septal wall thickness is mildly increased. Spectral Doppler shows a normal pattern of left ventricular diastolic filling. IV septum 1.24 cm.  Left Atrium: The left atrium is moderately dilated.  Right Ventricle: The right ventricle is moderately enlarged. There is normal right ventricular global systolic function.  Right Atrium: The right atrium is upper limits of normal in size.  Aortic Valve: The aortic valve was not well visualized. There is no evidence of aortic valve regurgitation.  Mitral Valve: The mitral valve is normal in structure. There is trace mitral valve regurgitation.  Tricuspid Valve: The tricuspid valve is structurally normal. There is trace tricuspid regurgitation. The right ventricular systolic pressure is unable to be estimated.  Pulmonic Valve: The pulmonic valve is structurally normal. There is physiologic pulmonic valve regurgitation.  Pericardium: There is no pericardial effusion noted. There is a pericardial fat pad present.  Aorta: The aortic root is normal.       CONCLUSIONS:   1. Left ventricular systolic function is normal with a 60-65% estimated ejection fraction.   2. Mildly increased left ventricular septal thickness.   3. Moderately enlarged right ventricle.   4. The left atrium is moderately dilated.    QUANTITATIVE DATA SUMMARY:  2D MEASUREMENTS:                           Normal Ranges:  IVSd:          1.60 cm   (0.6-1.1cm)  LVPWd:         0.75 cm   (0.6-1.1cm)  LVIDd:         5.14 cm   (3.9-5.9cm)  LVIDs:         3.29 cm  LV Mass Index: 82.4 g/m2  LV % FS        36.1 %    LA VOLUME:                                Normal Ranges:  LA Vol A4C:        101.6 ml   (22+/-6mL/m2)  LA Vol A2C:        122.3 ml  LA Vol BP:         121.1 ml  LA Vol Index A4C:  35.3 ml/m2  LA Vol Index A2C:  42.5 ml/m2  LA Vol Index BP:   42.1 ml/m2  LA Area A4C:       27.0 cm2  LA Area A2C:       32.2 cm2  LA Major Axis A4C: 6.1 cm  LA Major Axis A2C: 7.2 cm  LA Volume Index:    42.1 ml/m2  LA Vol A4C:        95.7 ml    RA VOLUME BY A/L METHOD:                        Normal Ranges:  RA Area A4C: 20.4 cm2    M-MODE MEASUREMENTS:                   Normal Ranges:  Ao Root: 3.40 cm (2.0-3.7cm)  AoV Exc: 2.30 cm (1.5-2.5cm)  LAs:     3.96 cm (2.7-4.0cm)    AORTA MEASUREMENTS:                       Normal Ranges:  AoV Exc:     2.30 cm (1.5-2.5cm)  Ao Sinus, d: 3.00 cm (2.1-3.5cm)  Ao STJ, d:   2.40 cm (1.7-3.4cm)  Asc Ao, d:   2.80 cm (2.1-3.4cm)    LV SYSTOLIC FUNCTION BY 2D PLANIMETRY (MOD):                      Normal Ranges:  EF-A4C View: 65.8 % (>=55%)  EF-A2C View: 63.9 %  EF-Biplane:  65.0 %    LV DIASTOLIC FUNCTION:                            Normal Ranges:  MV Peak E:    1.07 m/s    (0.7-1.2 m/s)  MV Peak A:    0.80 m/s    (0.42-0.7 m/s)  E/A Ratio:    1.33        (1.0-2.2)  MV e'         0.16 m/s    (>8.0)  MV lateral e' 0.16 m/s  MV medial e'  0.15 m/s  MV A Dur:     114.18 msec  E/e' Ratio:   6.91        (<8.0)    MITRAL VALVE:                  Normal Ranges:  MV DT: 188 msec (150-240msec)    AORTIC VALVE:                         Normal Ranges:  LVOT Diameter: 2.01 cm (1.8-2.4cm)       RIGHT VENTRICLE:  RV Basal 4.64 cm  RV Mid   3.90 cm  RV Major 8.1 cm  TAPSE:   27.4 mm  RV s'    0.17 m/s    TRICUSPID VALVE/RVSP:                Normal Ranges:  TV e' 0.2 m/s    AORTA:  Asc Ao Diam 2.82 cm       [id number] Kelli Woods MD  Electronically signed on 1/30/2024 at 10:42:24 AM       ** Final **    Problem List Items Addressed This Visit       Benign essential hypertension    Morbid obesity (CMS/HCC)    Right ventricular dilation - Primary    LVH (left ventricular hypertrophy)           Kanu Quarles DO, FACC, FACOI

## 2024-03-18 ENCOUNTER — OFFICE VISIT (OUTPATIENT)
Dept: CARDIOLOGY | Facility: CLINIC | Age: 39
End: 2024-03-18
Payer: COMMERCIAL

## 2024-03-18 VITALS — SYSTOLIC BLOOD PRESSURE: 168 MMHG | HEART RATE: 87 BPM | DIASTOLIC BLOOD PRESSURE: 110 MMHG

## 2024-03-18 DIAGNOSIS — I51.7 RIGHT VENTRICULAR DILATION: Primary | ICD-10-CM

## 2024-03-18 DIAGNOSIS — I51.7 LVH (LEFT VENTRICULAR HYPERTROPHY): ICD-10-CM

## 2024-03-18 DIAGNOSIS — E66.01 MORBID OBESITY (MULTI): ICD-10-CM

## 2024-03-18 DIAGNOSIS — I10 BENIGN ESSENTIAL HYPERTENSION: ICD-10-CM

## 2024-03-18 PROCEDURE — 3077F SYST BP >= 140 MM HG: CPT | Performed by: INTERNAL MEDICINE

## 2024-03-18 PROCEDURE — 93000 ELECTROCARDIOGRAM COMPLETE: CPT | Performed by: INTERNAL MEDICINE

## 2024-03-18 PROCEDURE — 99204 OFFICE O/P NEW MOD 45 MIN: CPT | Performed by: INTERNAL MEDICINE

## 2024-03-18 PROCEDURE — 3008F BODY MASS INDEX DOCD: CPT | Performed by: INTERNAL MEDICINE

## 2024-03-18 PROCEDURE — 3080F DIAST BP >= 90 MM HG: CPT | Performed by: INTERNAL MEDICINE

## 2024-03-18 PROCEDURE — 1036F TOBACCO NON-USER: CPT | Performed by: INTERNAL MEDICINE

## 2024-03-22 DIAGNOSIS — I10 BENIGN ESSENTIAL HYPERTENSION: ICD-10-CM

## 2024-03-22 RX ORDER — LISINOPRIL 20 MG/1
20 TABLET ORAL 2 TIMES DAILY
Qty: 180 TABLET | Refills: 0 | Status: SHIPPED | OUTPATIENT
Start: 2024-03-22

## 2024-03-25 DIAGNOSIS — E66.01 MORBID OBESITY (MULTI): ICD-10-CM

## 2024-04-01 ENCOUNTER — APPOINTMENT (OUTPATIENT)
Dept: ENDOCRINOLOGY | Facility: CLINIC | Age: 39
End: 2024-04-01
Payer: COMMERCIAL

## 2024-04-01 ENCOUNTER — OFFICE VISIT (OUTPATIENT)
Dept: ENDOCRINOLOGY | Facility: CLINIC | Age: 39
End: 2024-04-01
Payer: COMMERCIAL

## 2024-04-01 VITALS
HEIGHT: 71 IN | DIASTOLIC BLOOD PRESSURE: 88 MMHG | BODY MASS INDEX: 44.1 KG/M2 | WEIGHT: 315 LBS | SYSTOLIC BLOOD PRESSURE: 144 MMHG

## 2024-04-01 DIAGNOSIS — E88.810 DYSMETABOLIC SYNDROME: Primary | ICD-10-CM

## 2024-04-01 DIAGNOSIS — E66.01 MORBID OBESITY (MULTI): ICD-10-CM

## 2024-04-01 DIAGNOSIS — I10 BENIGN ESSENTIAL HYPERTENSION: ICD-10-CM

## 2024-04-01 PROCEDURE — 3008F BODY MASS INDEX DOCD: CPT | Performed by: INTERNAL MEDICINE

## 2024-04-01 PROCEDURE — 3079F DIAST BP 80-89 MM HG: CPT | Performed by: INTERNAL MEDICINE

## 2024-04-01 PROCEDURE — 99204 OFFICE O/P NEW MOD 45 MIN: CPT | Performed by: INTERNAL MEDICINE

## 2024-04-01 PROCEDURE — 3077F SYST BP >= 140 MM HG: CPT | Performed by: INTERNAL MEDICINE

## 2024-04-01 NOTE — PROGRESS NOTES
"Patient ID: Ronal Olivas \"Ilya\" is a 38 y.o. male who presents for Obesity (PSMF).  HPI  The patient is referred for evaluation for PSMF.    This is a 38-year-old gentleman who has had a problem with weight most of his adult life.    He has tried numerous approaches with limited but no long-term success.    Weight is complicated by insulin resistance hypertension hyperlipidemia edema GERD and joint pain.    He avoids skipping meals.    He does a lot of snacking and late night eating.    He is not exercising.    He has a past history that is otherwise negative he has never had gout kidney stones or gallstones.    Socially he is single works as a  non-smoker nondrinker.    Family history negative for diabetes or thyroid.    ROS  Comprehensive review of systems is negative.    Objective   Physical Exam  Visit Vitals  /88      Vitals:    04/01/24 0939   Weight: (!) 196 kg (432 lb)      Body mass index is 60.25 kg/m².      Alert and oriented x3  In no distress  No focal neurologic deficits  No supraclavicular, or dorsal fat  No purple striae  Integument intact  Eyes normal  ENT normal. No adenopathy  Thyroid palpable and normal. No nodules  Chest clear to auscultation  Heart sounds are normal  Abdomen nontender. Bowel sounds normal. No organomegaly  Feet are okay  Reflexes normal with normal return    Current Outpatient Medications   Medication Sig Dispense Refill    amLODIPine (Norvasc) 2.5 mg tablet Take 1 tablet (2.5 mg) by mouth once daily. 90 tablet 1    lisinopril 20 mg tablet Take 1 tablet (20 mg) by mouth 2 times a day. 180 tablet 0     No current facility-administered medications for this visit.       Assessment/Plan     1.  Likely insulin resistance  2.  Hypertension  3.  Hyperlipidemia  4.  Edema  5.  Knee pain  6.  GERD    We discussed insulin resistance its pathophysiology and its impact.    We discussed risks, benefits and alternatives to the protein sparing modified fast.    We " discussed risks, including, but not limited to the potential for electrolyte imbalance which could lead to cardiac arrhythmia.  The high protein nature of the diet increasing levels of uric acid which could lead to nephrolithiasis or gout.  The low fiber nature of the diet increasing risk for constipation.  Risk for gallstones, cold intolerance, excess skin seen with significant weight loss.      We discussed the benefits of weight loss regarding co-morbid conditions.      We also discussed alternatives to the program, including a balanced calorie restricted approach coupled with exercise but he indicates that he cannot see the nutritionist because it is not covered by his insurance.    He will look into weight loss medications however if nutrition is not covered I advised that it is unlikely that weight loss medications would be covered.    If they happen to be I would work with zepbound.    If not would look at treating with metformin.    He will follow-up with me in 2 months sooner as needed.

## 2024-04-05 DIAGNOSIS — I10 BENIGN ESSENTIAL HYPERTENSION: Primary | ICD-10-CM

## 2024-04-05 DIAGNOSIS — R60.0 LOWER EXTREMITY EDEMA: ICD-10-CM

## 2024-04-05 RX ORDER — SPIRONOLACTONE 25 MG/1
25 TABLET ORAL 2 TIMES DAILY
Qty: 180 TABLET | Refills: 1 | Status: SHIPPED | OUTPATIENT
Start: 2024-04-05 | End: 2025-04-05

## 2024-04-05 NOTE — TELEPHONE ENCOUNTER
"4/5/24  1613  Informed Dr. Quarles about LE edema.  Per Dr. Quarles amlodipine to be discontinued and patient to start Aldactone 25 mg BID with BMP and mg++ in one week.    Called and informed patient  of such. Patient verbalized understanding and was agreeable.    Amlodipine discontinued and Aldactone sent for approval.    Labs ordered.    ----- Message from Melissa Vaughn RN sent at 4/5/2024 12:38 PM EDT -----  Regarding: FW: Sore swollen feet   Contact: [phone]    ----- Message -----  From: Ronal Olivas \"Ilya\"  Sent: 4/3/2024   4:49 PM EDT  To: Do [id number] Card1 Clinical Support Staff  Subject: Sore swollen feet                                If she is the one who prescribed the medication I would let her know.        Ronal Olivas \"Ilya\"  P Do [id number] Card1 Clinical Support Staff (supporting Kanu Quarles, )2 days ago       Dr Xochitl Olivas \"Ilya\"  P Do [id number] Card1 Clinical Support Staff (supporting Kanu Quarles, )2 days ago       I have been moving because I just bought a house but my feet were hurting so much yesterday and today I ended up taking 8 Excedrin migraine extra strength and it filled it enough to walk but still hurts       Ronal Olivas \"Ilya\"  P Do [id number] Card1 Clinical Support Staff (supporting Kanu Quarles, DO)2 days ago       My primary Dr Leung        You  Ronal Olivas \"Ilya\"2 days ago       Ilya,     Who prescribed your blood pressure medications?  Looking at your medication profile I do not believe it was Dr. Quarles.  But let me know for sure.       Ronal Olivas \"Ilya\"  P Do [id number] Card1 Clinical Support Staff (supporting Kanu Quarles, )2 days ago       Since I started taking the blood pressure meds my feet have been mildly swollen and sore but lately it's getting worse. My toes on my right foot get freezing cold and the muscles behind my left knee hurt really bad to the point I can barely move it. When I get " up in the morning my feet hurt so bad I can barely stand up but as I continue to stand the pain lessens enough to walk. my lower legs from calves to ankles feels swollen constantly and calves ach

## 2024-04-05 NOTE — TELEPHONE ENCOUNTER
"----- Message from Melissa Vaughn RN sent at 4/5/2024 12:38 PM EDT -----  Regarding: FW: Sore swollen feet   Contact: [phone]    ----- Message -----  From: Ronal Olivas \"Ilya\"  Sent: 4/3/2024   4:49 PM EDT  To: Do [id number] Card1 Clinical Support Staff  Subject: Sore swollen feet                                I did and she said to tell the cardiologist     "

## 2024-04-08 ENCOUNTER — OFFICE VISIT (OUTPATIENT)
Dept: PRIMARY CARE | Facility: CLINIC | Age: 39
End: 2024-04-08
Payer: COMMERCIAL

## 2024-04-08 ENCOUNTER — LAB (OUTPATIENT)
Dept: LAB | Facility: LAB | Age: 39
End: 2024-04-08
Payer: COMMERCIAL

## 2024-04-08 VITALS
HEART RATE: 72 BPM | BODY MASS INDEX: 59.11 KG/M2 | DIASTOLIC BLOOD PRESSURE: 81 MMHG | SYSTOLIC BLOOD PRESSURE: 126 MMHG | OXYGEN SATURATION: 96 % | WEIGHT: 315 LBS | TEMPERATURE: 98 F

## 2024-04-08 DIAGNOSIS — R60.0 LOWER EXTREMITY EDEMA: ICD-10-CM

## 2024-04-08 DIAGNOSIS — I10 BENIGN ESSENTIAL HYPERTENSION: ICD-10-CM

## 2024-04-08 DIAGNOSIS — R60.0 BILATERAL LOWER EXTREMITY EDEMA: ICD-10-CM

## 2024-04-08 DIAGNOSIS — I10 BENIGN ESSENTIAL HYPERTENSION: Primary | ICD-10-CM

## 2024-04-08 DIAGNOSIS — E88.810 DYSMETABOLIC SYNDROME: Primary | ICD-10-CM

## 2024-04-08 LAB
ANION GAP SERPL CALC-SCNC: 11 MMOL/L (ref 10–20)
BUN SERPL-MCNC: 13 MG/DL (ref 6–23)
CALCIUM SERPL-MCNC: 9.7 MG/DL (ref 8.6–10.3)
CHLORIDE SERPL-SCNC: 105 MMOL/L (ref 98–107)
CO2 SERPL-SCNC: 27 MMOL/L (ref 21–32)
CREAT SERPL-MCNC: 0.81 MG/DL (ref 0.5–1.3)
EGFRCR SERPLBLD CKD-EPI 2021: >90 ML/MIN/1.73M*2
GLUCOSE SERPL-MCNC: 90 MG/DL (ref 74–99)
MAGNESIUM SERPL-MCNC: 2.33 MG/DL (ref 1.6–2.4)
POTASSIUM SERPL-SCNC: 4.1 MMOL/L (ref 3.5–5.3)
SODIUM SERPL-SCNC: 139 MMOL/L (ref 136–145)

## 2024-04-08 PROCEDURE — 3074F SYST BP LT 130 MM HG: CPT | Performed by: NURSE PRACTITIONER

## 2024-04-08 PROCEDURE — 80048 BASIC METABOLIC PNL TOTAL CA: CPT

## 2024-04-08 PROCEDURE — 3079F DIAST BP 80-89 MM HG: CPT | Performed by: NURSE PRACTITIONER

## 2024-04-08 PROCEDURE — 1036F TOBACCO NON-USER: CPT | Performed by: NURSE PRACTITIONER

## 2024-04-08 PROCEDURE — 99213 OFFICE O/P EST LOW 20 MIN: CPT | Performed by: NURSE PRACTITIONER

## 2024-04-08 PROCEDURE — 83735 ASSAY OF MAGNESIUM: CPT

## 2024-04-08 PROCEDURE — 3008F BODY MASS INDEX DOCD: CPT | Performed by: NURSE PRACTITIONER

## 2024-04-08 PROCEDURE — 36415 COLL VENOUS BLD VENIPUNCTURE: CPT

## 2024-04-08 RX ORDER — METFORMIN HYDROCHLORIDE 1000 MG/1
1000 TABLET ORAL
Qty: 180 TABLET | Refills: 3 | Status: SHIPPED | OUTPATIENT
Start: 2024-04-08 | End: 2025-04-08

## 2024-04-08 ASSESSMENT — ENCOUNTER SYMPTOMS
VOMITING: 0
PALPITATIONS: 0
SHORTNESS OF BREATH: 0
COUGH: 0
DIZZINESS: 0
CHILLS: 0
WEAKNESS: 0
DIARRHEA: 0
NUMBNESS: 0
CHEST TIGHTNESS: 0
FATIGUE: 0
NAUSEA: 0
ABDOMINAL PAIN: 0
HEADACHES: 0
FEVER: 0

## 2024-04-08 ASSESSMENT — PATIENT HEALTH QUESTIONNAIRE - PHQ9
2. FEELING DOWN, DEPRESSED OR HOPELESS: NOT AT ALL
1. LITTLE INTEREST OR PLEASURE IN DOING THINGS: NOT AT ALL
SUM OF ALL RESPONSES TO PHQ9 QUESTIONS 1 AND 2: 0

## 2024-04-08 NOTE — PROGRESS NOTES
"Subjective   Ronal Olivas \"Ilya\" is a 38 y.o. male who presents for Foot Swelling (Huy feet swelling- happened when he started Amlodipine.  He also mentioned his left knee has some discomfort).    HPI  He presents to the office today for evaluation of edema to BLE.  He reports he has had mild swelling for a while but generally worse at the end of the day. This seems to be going on since starting the Amlodipine.  Has been getting worse.  Reports pain in the left leg up into the hamstring. He reports the muscle connecting the hamstring to behind knee feels very tight.  This was worse after sitting or laying and improved with walking.  Feet also hurt so bad when first standing on them and better once walking.  Has been moving to a new home and on his feet a lot.   No chest pain, shortness of breath, palpitations. No headaches, numbness, tingling, weakness or vision changes.  No dizziness.  Home blood pressure readings: 120-130/80-90  HR 57-99  Diet: has been avoiding gluten and feels a little less hungry.  Weight: Down    Over the weekend stopped the Amlodipine and started Spironolactone ordered by cardiology.  Symptomas are all slowly improving.       Review of Systems   Constitutional:  Negative for chills, fatigue and fever.   Eyes:  Negative for visual disturbance.   Respiratory:  Negative for cough, chest tightness and shortness of breath.    Cardiovascular:  Positive for leg swelling. Negative for chest pain and palpitations.   Gastrointestinal:  Negative for abdominal pain, diarrhea, nausea and vomiting.   Neurological:  Negative for dizziness, weakness, numbness and headaches.       Objective   /81 (BP Location: Left arm, Patient Position: Sitting) Comment: electronic machine  Pulse 72   Temp 36.7 °C (98 °F) (Temporal)   Wt (!) 192 kg (423 lb 12.8 oz)   SpO2 96%   BMI 59.11 kg/m²     Physical Exam  Constitutional:       General: He is not in acute distress.     Appearance: Normal appearance. " He is not toxic-appearing.   Eyes:      Extraocular Movements: Extraocular movements intact.      Conjunctiva/sclera: Conjunctivae normal.      Pupils: Pupils are equal, round, and reactive to light.   Neck:      Vascular: No carotid bruit.   Cardiovascular:      Rate and Rhythm: Normal rate and regular rhythm.      Pulses: Normal pulses.      Heart sounds: Normal heart sounds, S1 normal and S2 normal. No murmur heard.     Comments: No calf pain noted on exam. No pain behind the knee on palpation.  Pulmonary:      Effort: Pulmonary effort is normal. No respiratory distress.      Breath sounds: Normal breath sounds.   Abdominal:      General: Bowel sounds are normal.      Palpations: Abdomen is soft.      Tenderness: There is no abdominal tenderness.   Musculoskeletal:      Right lower le+ Edema present.      Left lower le+ Edema present.   Lymphadenopathy:      Cervical: No cervical adenopathy.   Neurological:      Mental Status: He is alert and oriented to person, place, and time.   Psychiatric:         Attention and Perception: Attention normal.         Mood and Affect: Mood and affect normal.         Behavior: Behavior normal. Behavior is cooperative.         Thought Content: Thought content normal.         Cognition and Memory: Cognition normal.         Judgment: Judgment normal.         Assessment/Plan   Problem List Items Addressed This Visit       Benign essential hypertension - Primary     Well controlled today. Continue medications at the current dose.         Bilateral lower extremity edema     Slowly improving since stopping Amlodipine. Advised to elevate and continue to monitor.             It has been a pleasure seeing you today!

## 2024-04-14 ENCOUNTER — HOSPITAL ENCOUNTER (OUTPATIENT)
Dept: RADIOLOGY | Facility: HOSPITAL | Age: 39
Discharge: HOME | End: 2024-04-14
Payer: COMMERCIAL

## 2024-04-14 DIAGNOSIS — I51.7 LVH (LEFT VENTRICULAR HYPERTROPHY): ICD-10-CM

## 2024-04-14 DIAGNOSIS — I51.7 RIGHT VENTRICULAR DILATION: ICD-10-CM

## 2024-04-14 DIAGNOSIS — E66.01 MORBID OBESITY (MULTI): ICD-10-CM

## 2024-04-14 DIAGNOSIS — I10 BENIGN ESSENTIAL HYPERTENSION: ICD-10-CM

## 2024-04-14 PROCEDURE — 75571 CT HRT W/O DYE W/CA TEST: CPT

## 2024-04-16 ENCOUNTER — TELEPHONE (OUTPATIENT)
Dept: CARDIOLOGY | Facility: CLINIC | Age: 39
End: 2024-04-16
Payer: COMMERCIAL

## 2024-04-16 NOTE — TELEPHONE ENCOUNTER
4/16/24  1020  Called results to patient with patient verbalizing understanding.    ----- Message from Kanu Quarles DO sent at 4/15/2024  5:20 PM EDT -----  Coronary artery calcium score of 0 placing the patient in a low risk category.

## 2024-04-30 NOTE — PROGRESS NOTES
[facility]    Patient Name: Ronal Olivas  Patient : 1985      Scribe Attestation  By signing my name below, IBibiana Scribe   attest that this documentation has been prepared under the direction and in the presence of Kanu Quarles DO.      Reason for visit:  This is a 38-year-old male here for follow-up regarding right ventricular dilatation, LVH, hypertension, morbid obesity, and lower extremity edema.    HPI:  This is a 38-year-old male here for follow-up regarding right ventricular dilatation, LVH, hypertension, morbid obesity, and lower extremity edema.  The patient was last evaluated by me in 2024.  At that visit I ordered a coronary calcium score, suggested a sleep study which the patient declined, and asked the patient to follow-up in 6 weeks.  Patient subsequently called the cardiology office reporting cold toes on his right foot as well as pain behind his left knee as well as lower extremity edema which he believes was medication related.  Subsequently discontinued amlodipine and started spironolactone 25 mg twice daily with some improvement of his symptoms.  BMP done 2024 showed normal serum sodium and potassium with a serum creatinine of 0.81, serum magnesium was 2.33. CT cardiac scoring done in 2024 showed coronary artery calcium score of 0.  ECG done today showed sinus rhythm with a heart rate of 79 bpm.  The patient reports that his lower extremity edema had improved since he started spironolactone. He denies any new chest pain, shortness of breath, palpitations and lightheadedness. He also reports intermittent muscle tightness on his legs for which he had been taking magnesium. He states that his blood pressure at home is usually within the 120-130s range. He states that his blood pressure elevations are likely due to stress. He states that he takes all of his medications as prescribed. During my exam, he was  resting comfortably on the exam table.            Assessment/Plan:   Right ventricular dilatation  Echocardiogram done in January 2024 showed normal left ventricular systolic function with an ejection fraction of 60 to 65%, mild septal LVH, dilated right ventricle with normal right ventricular systolic function, and no significant valve abnormalities.  ECG done today showed showed sinus rhythm with a heart rate of 79 bpm.     He denies chest pain, shortness of breath, palpitations or lightheadedness.   He does not appear significantly volume overloaded on exam today.   He should continue current cardiac medications.  Recent lab works as noted in the HPI.   Lab works as noted below will be done in 6 months prior to his next visit.   I discussed with him the importance of following a low-sodium heart healthy diet as well as weight loss.  Follow up in 6 months and sooner if necessary.      LVH  Echocardiogram done in January 2024 showed normal left ventricular systolic function with an ejection fraction of 60 to 65%, mild septal LVH, dilated right ventricle with normal right ventricular systolic function, and no significant valve abnormalities.  CT cardiac scoring done in April 2024 showed coronary artery calcium score of 0.  ECG done today showed sinus rhythm with a heart rate of 79 bpm.   He denies chest pain, shortness of breath, palpitations or lightheadedness.   Blood pressure appears moderately controlled on exam today.  He should continue current antihypertensive medications.  Recent lab works as noted in the HPI.  Lab works as noted below will be done in 6 months prior to his next visit.   Continue risk factor modification.  Follow up in 6 months and sooner if necessary.      3. Hypertension  The patient has a history of hypertension which appears moderately controlled on exam today.  He states that his blood pressure at home is usually within acceptable range.   He should continue his current antihypertensive  medications.      4. Morbid obesity  The patient had previously reclined sleep study.  Please see lifestyle recommendations below.     5. Lower extremity edema  The patient reports that his lower extremity edema had improved after he started taking spironolactone.  He has trivial pitting bilateral lower extremity edema on exam today.  I discussed with him the importance of following a low-sodium heart healthy diet as well as weight loss, wearing compression stockings and elevating legs when seated.      Orders:   BMP/BNP/magnesium in 6 months,   Follow-up in 6 months.    Lifestyle Recommendations  I recommend a whole-food plant-based diet, an eating pattern that encourages the consumption of unrefined plant foods (such as fruits, vegetables, tubers, whole grains, legumes, nuts and seeds) and discourages meats, dairy products, eggs and processed foods.     The AHA/ACC recommends that the patient consume a dietary pattern that emphasizes intake of vegetables, fruits, and whole grains; includes low-fat dairy products, poultry, fish, legumes, non-tropical vegetable oils, and nuts; and limits intake of sodium, sweets, sugar-sweetened beverages, and red meats.  Adapt this dietary pattern to appropriate calorie requirements (a 500-750 kcal/day deficit to loose weight), personal and cultural food preferences, and nutrition therapy for other medical conditions (including diabetes).  Achieve this pattern by following plans such as the [place] Mediterranean, DASH dietary pattern, or AHA diet.     Engage in 2 hours and 30 minutes per week of moderate-intensity physical activity, or 1 hour and 15 minutes (75 minutes) per week of vigorous-intensity aerobic physical activity, or an equivalent combination of moderate and vigorous-intensity aerobic physical activity. Aerobic activity should be performed in episodes of at least 10 minutes preferably spread throughout the week.     Adhering to a heart healthy diet, regular exercise  habits, avoidance of tobacco products, and maintenance of a healthy weight are crucial components of their heart disease risk reduction.     Any positive review of systems not specifically addressed in the office visit today should be evaluated and treated by the patients primary care physician or in an emergency department if necessary     Patient was notified that results from ordered tests will be called to the patient if it changes current management; it will otherwise be discussed at a future appointment and available on [facility].     Thank you for allowing me to participate in the care of this patient.        This document was generated using the assistance of voice recognition software. If there are any errors of spelling, grammar, syntax, or meaning; please feel free to contact me directly for clarification.    Past Medical History:  He has a past medical history of ADHD (attention deficit hyperactivity disorder), Headache, Hypertension, and Seizures (Multi).    Past Surgical History:  He has no past surgical history on file.      Social History:  He reports that he has never smoked. He has never been exposed to tobacco smoke. He has never used smokeless tobacco. He reports that he does not currently use alcohol. He reports that he does not currently use drugs.    Family History:  Family History   Problem Relation Name Age of Onset    No Known Problems Mother      No Known Problems Father      Hypertension Maternal Grandfather Akira         Allergies:  Shellfish containing products and Dilantin [phenytoin sodium extended]    Outpatient Medications:  Current Outpatient Medications   Medication Instructions    lisinopril 20 mg, oral, 2 times daily    metFORMIN (GLUCOPHAGE) 1,000 mg, oral, 2 times daily with meals    spironolactone (ALDACTONE) 25 mg, oral, 2 times daily        ROS:  A 14 point review of systems was done and is negative other than as stated in HPI    Vitals:      10/2/2023     8:21 AM  "1/15/2024     2:04 PM 1/15/2024     2:33 PM 3/18/2024     3:04 PM 4/1/2024     9:39 AM 4/8/2024    11:41 AM 4/8/2024    11:45 AM   Vitals   Systolic 120 137  168 144 126    Diastolic 86 82  110 88 81    Heart Rate 85 106 101 87  70 72   Temp 36.6 °C (97.9 °F) 36.3 °C (97.3 °F)    36.7 °C (98 °F)    Height (in)     1.803 m (5' 11\")     Weight (lb) 415.2 426.2   432 423.8    BMI 58.73 kg/m2 60.29 kg/m2   60.25 kg/m2 59.11 kg/m2    BSA (m2) 3.06 m2 3.1 m2   3.13 m2 3.1 m2         Physical Exam:   Constitutional: Cooperative, in no acute distress, alert, appears stated age.  Skin: Skin color, texture, turgor normal. No rashes or lesions.  Head: Normocephalic. No masses, lesions, tenderness or abnormalities  Eyes: Extraocular movements are grossly intact.  Mouth and throat: Mucous membranes moist  Neck: Neck supple, no carotid bruits, no JVD  Respiratory: Lungs clear to auscultation, no wheezing or rhonchi, no use of accessory muscles  Chest wall: No scars, normal excursion with respiration  Cardiovascular: Regular rhythm without murmur  Gastrointestinal: Abdomen soft, nontender. Bowel sounds normal. Morbidly obese.  Musculoskeletal: Strength equal in upper extremities  Extremities: Trivial pitting edema  Neurologic: Sensation grossly intact, alert and oriented ×3      Intake/Output:   No intake/output data recorded.    Outpatient Medications  Current Outpatient Medications on File Prior to Visit   Medication Sig Dispense Refill    lisinopril 20 mg tablet Take 1 tablet (20 mg) by mouth 2 times a day. 180 tablet 0    metFORMIN (Glucophage) 1,000 mg tablet Take 1 tablet (1,000 mg) by mouth 2 times a day with meals. 180 tablet 3    spironolactone (Aldactone) 25 mg tablet Take 1 tablet (25 mg) by mouth 2 times a day. 180 tablet 1     No current facility-administered medications on file prior to visit.       Labs: (past 26 weeks)  Recent Results (from the past 4368 hour(s))   Basic Metabolic Panel    Collection Time: 11/03/23 "  4:44 PM   Result Value Ref Range    Glucose 95 74 - 99 mg/dL    Sodium 137 136 - 145 mmol/L    Potassium 4.0 3.5 - 5.3 mmol/L    Chloride 103 98 - 107 mmol/L    Bicarbonate 27 21 - 32 mmol/L    Anion Gap 11 10 - 20 mmol/L    Urea Nitrogen 13 6 - 23 mg/dL    Creatinine 0.85 0.50 - 1.30 mg/dL    eGFR >90 >60 mL/min/1.73m*2    Calcium 9.4 8.6 - 10.3 mg/dL   Lipid Panel    Collection Time: 01/20/24  8:30 AM   Result Value Ref Range    Cholesterol 159 0 - 199 mg/dL    HDL-Cholesterol 41.1 mg/dL    Cholesterol/HDL Ratio 3.9     LDL Calculated 102 (H) <=99 mg/dL    VLDL 16 0 - 40 mg/dL    Triglycerides 78 0 - 149 mg/dL    Non HDL Cholesterol 118 0 - 149 mg/dL   Transthoracic Echo (TTE) Complete    Collection Time: 01/29/24  7:44 AM   Result Value Ref Range    LVOT diam 2.01 cm    LV EF 65 %    MV E/A ratio 1.33     MV avg E/e' ratio 6.91     Tricuspid annular plane systolic excursion 2.7 cm    LA vol index A/L 42.1 ml/m2    RV free wall pk S' 17.20 cm/s    LVIDd 5.14 cm    LV A4C EF 65.8    Basic metabolic panel    Collection Time: 04/08/24  1:10 PM   Result Value Ref Range    Glucose 90 74 - 99 mg/dL    Sodium 139 136 - 145 mmol/L    Potassium 4.1 3.5 - 5.3 mmol/L    Chloride 105 98 - 107 mmol/L    Bicarbonate 27 21 - 32 mmol/L    Anion Gap 11 10 - 20 mmol/L    Urea Nitrogen 13 6 - 23 mg/dL    Creatinine 0.81 0.50 - 1.30 mg/dL    eGFR >90 >60 mL/min/1.73m*2    Calcium 9.7 8.6 - 10.3 mg/dL   Magnesium    Collection Time: 04/08/24  1:10 PM   Result Value Ref Range    Magnesium 2.33 1.60 - 2.40 mg/dL       ECG  Encounter Date: 03/18/24   ECG 12 Lead    Narrative    Sinus rhythm, LVH, possible anterior infarct age undetermined, heart rate   87 bpm.       Echocardiogram  No results found for this or any previous visit from the past 1095 days.      CV Studies:  EKG:  Encounter Date: 03/18/24   ECG 12 Lead    Narrative    Sinus rhythm, LVH, possible anterior infarct age undetermined, heart rate   87 bpm.     Echocardiogram: No  results found for this or any previous visit from the past 1825 days.    Stress Testing IMGRESULT(BOU9172:1:1825): No results found for this or any previous visit from the past 1825 days.    Cardiac Catheterization: No results found for this or any previous visit from the past 1825 days.  No results found for this or any previous visit from the past 3650 days.     Cardiac Scoring:   CT cardiac scoring wo IV contrast 04/14/2024    Narrative  Interpreted By:  Andres Fitzgerald,  STUDY:  CT CARDIAC SCORING WO IV CONTRAST;  4/14/2024 11:27 am    INDICATION:  Signs/Symptoms:See associated diagnoses.    COMPARISON:  None.    ACCESSION NUMBER(S):  [id number]    ORDERING CLINICIAN:  ANTONIETA SULLIVAN    TECHNIQUE:  Using prospective ECG gating, CT scan of the coronary arteries was  performed without intravenous contrast. Coronary calcium scoring  was  performed according to the method of Agatston.    FINDINGS:  The calcium score in the coronary arteries is as follows:    LM 0  LAD 0  LCx 0  RCA 0    Total 0    The visualized mid/lower ascending thoracic aorta measures 3.1 cm in  diameter. The heart is normal in size. No pericardial effusion is  present.    No gross evidence of mediastinal or hilar lymphadenopathy or masses  is identified. The visualized segments of the lungs are normally  expanded.    The visualized subdiaphragmatic structures appear intact.    Impression  Coronary artery calcium score of 0*.    *Coronary artery calcium scoring may be helpful in predicting the  risk for future coronary heart disease events.  According to the  American College of Cardiology Foundation Clinical Expert Consensus  Task Force, such testing provides important prognostic information in  patients with more than one coronary heart disease risk factor. The  coronary artery calcium score correlates with the annual risk of a  non-fatal myocardial infarction or coronary heart disease death.    Coronary artery score            Annual Risk    0-99   "                           0.4%  100-399                        1.3%  >400                            2.4%    These three \"breakpoints\" correspond to lower, intermediate and high  risk states for future coronary events.  Such information should be  used, along with appropriate clinical judgment, to make decisions  regarding the intensity of risk factor management strategies to treat  blood lipids and to modify other non-lipid coronary risk factors.    Reference: Pierre Part P et al. Circulation.  2007; 115:402-426    MACRO:  None    Signed by: Andres Fitzgerald 4/15/2024 4:56 PM  Dictation workstation:   [id number]    AAA : No results found for this or any previous visit from the past 1825 days.    OTHER: No results found for this or any previous visit from the past 1825 days.    LAST IMAGING RESULTS  CT cardiac scoring wo IV contrast  Narrative: Interpreted By:  Andres Fitzgerald,   STUDY:  CT CARDIAC SCORING WO IV CONTRAST;  4/14/2024 11:27 am      INDICATION:  Signs/Symptoms:See associated diagnoses.      COMPARISON:  None.      ACCESSION NUMBER(S):  [id number]      ORDERING CLINICIAN:  ANTONIETA SULLIVAN      TECHNIQUE:  Using prospective ECG gating, CT scan of the coronary arteries was  performed without intravenous contrast. Coronary calcium scoring  was  performed according to the method of Agatston.      FINDINGS:  The calcium score in the coronary arteries is as follows:      LM 0  LAD 0  LCx 0  RCA 0      Total 0      The visualized mid/lower ascending thoracic aorta measures 3.1 cm in  diameter. The heart is normal in size. No pericardial effusion is  present.      No gross evidence of mediastinal or hilar lymphadenopathy or masses  is identified. The visualized segments of the lungs are normally  expanded.      The visualized subdiaphragmatic structures appear intact.      Impression: Coronary artery calcium score of 0*.      *Coronary artery calcium scoring may be helpful in predicting the  risk for future coronary " "heart disease events.  According to the  American College of Cardiology Foundation Clinical Expert Consensus  Task Force, such testing provides important prognostic information in  patients with more than one coronary heart disease risk factor. The  coronary artery calcium score correlates with the annual risk of a  non-fatal myocardial infarction or coronary heart disease death.      Coronary artery score            Annual Risk      0-99                             0.4%  100-399                        1.3%  >400                            2.4%      These three \"breakpoints\" correspond to lower, intermediate and high  risk states for future coronary events.  Such information should be  used, along with appropriate clinical judgment, to make decisions  regarding the intensity of risk factor management strategies to treat  blood lipids and to modify other non-lipid coronary risk factors.      Reference: Paradise P et al. Circulation.  2007; 115:402-426      MACRO:  None      Signed by: Andres Fitzgerald 4/15/2024 4:56 PM  Dictation workstation:   [id number]    Problem List Items Addressed This Visit       Benign essential hypertension    Morbid obesity (Multi)    Right ventricular dilation - Primary    LVH (left ventricular hypertrophy)    Bilateral lower extremity edema          Kanu Quarles DO, FACC, FACOI  "

## 2024-05-06 ENCOUNTER — OFFICE VISIT (OUTPATIENT)
Dept: CARDIOLOGY | Facility: CLINIC | Age: 39
End: 2024-05-06
Payer: COMMERCIAL

## 2024-05-06 VITALS
HEART RATE: 79 BPM | HEIGHT: 71 IN | WEIGHT: 315 LBS | BODY MASS INDEX: 44.1 KG/M2 | DIASTOLIC BLOOD PRESSURE: 92 MMHG | SYSTOLIC BLOOD PRESSURE: 140 MMHG

## 2024-05-06 DIAGNOSIS — I10 BENIGN ESSENTIAL HYPERTENSION: ICD-10-CM

## 2024-05-06 DIAGNOSIS — E66.01 MORBID OBESITY (MULTI): ICD-10-CM

## 2024-05-06 DIAGNOSIS — I51.7 RIGHT VENTRICULAR DILATION: Primary | ICD-10-CM

## 2024-05-06 DIAGNOSIS — R60.0 BILATERAL LOWER EXTREMITY EDEMA: ICD-10-CM

## 2024-05-06 DIAGNOSIS — I51.7 LVH (LEFT VENTRICULAR HYPERTROPHY): ICD-10-CM

## 2024-05-06 PROCEDURE — 3008F BODY MASS INDEX DOCD: CPT | Performed by: INTERNAL MEDICINE

## 2024-05-06 PROCEDURE — 93000 ELECTROCARDIOGRAM COMPLETE: CPT | Performed by: INTERNAL MEDICINE

## 2024-05-06 PROCEDURE — 1036F TOBACCO NON-USER: CPT | Performed by: INTERNAL MEDICINE

## 2024-05-06 PROCEDURE — 99213 OFFICE O/P EST LOW 20 MIN: CPT | Performed by: INTERNAL MEDICINE

## 2024-05-06 PROCEDURE — 3077F SYST BP >= 140 MM HG: CPT | Performed by: INTERNAL MEDICINE

## 2024-05-06 PROCEDURE — 3080F DIAST BP >= 90 MM HG: CPT | Performed by: INTERNAL MEDICINE

## 2024-06-08 ENCOUNTER — LAB (OUTPATIENT)
Dept: LAB | Facility: LAB | Age: 39
End: 2024-06-08
Payer: COMMERCIAL

## 2024-06-10 ENCOUNTER — OFFICE VISIT (OUTPATIENT)
Dept: ENDOCRINOLOGY | Facility: CLINIC | Age: 39
End: 2024-06-10
Payer: COMMERCIAL

## 2024-06-10 VITALS — DIASTOLIC BLOOD PRESSURE: 82 MMHG | WEIGHT: 315 LBS | BODY MASS INDEX: 57.32 KG/M2 | SYSTOLIC BLOOD PRESSURE: 138 MMHG

## 2024-06-10 DIAGNOSIS — I10 BENIGN ESSENTIAL HYPERTENSION: ICD-10-CM

## 2024-06-10 DIAGNOSIS — R60.0 BILATERAL LOWER EXTREMITY EDEMA: ICD-10-CM

## 2024-06-10 DIAGNOSIS — E88.810 DYSMETABOLIC SYNDROME: Primary | ICD-10-CM

## 2024-06-10 PROCEDURE — 3079F DIAST BP 80-89 MM HG: CPT | Performed by: INTERNAL MEDICINE

## 2024-06-10 PROCEDURE — 99214 OFFICE O/P EST MOD 30 MIN: CPT | Performed by: INTERNAL MEDICINE

## 2024-06-10 PROCEDURE — 3075F SYST BP GE 130 - 139MM HG: CPT | Performed by: INTERNAL MEDICINE

## 2024-06-10 PROCEDURE — 3008F BODY MASS INDEX DOCD: CPT | Performed by: INTERNAL MEDICINE

## 2024-06-10 NOTE — PROGRESS NOTES
"Patient ID: Ronal Olivas \"Ilya\" is a 38 y.o. male who presents for Follow-up.  HPI  The patient comes in for follow up.    He has insulin resistance hypertension hyperlipidemia edema GERD and joint pain.    At the last appointment he looked into weight loss medication which was cost prohibitive.    Insurance does not cover PSMF.    We added metformin he is taking 500 mg 4 times daily and feels it is helping.    He has not been consistent with diet.    He notes his portion size is significantly smaller.    Physical he has no other complaints.    ROS  Comprehensive review of systems is negative.    Objective   Physical Exam  Visit Vitals  /82      Vitals:    06/10/24 0921   Weight: (!) 186 kg (411 lb)      Body mass index is 57.32 kg/m².      Weight 411 down 21 pounds    Eyes normal  ENT normal. No adenopathy  Thyroid palpable and normal. No nodules  Chest clear to auscultation  Heart sounds are normal  Abdomen nontender. Bowel sounds normal. No organomegaly  Feet are okay    Current Outpatient Medications   Medication Sig Dispense Refill    lisinopril 20 mg tablet Take 1 tablet (20 mg) by mouth 2 times a day. 180 tablet 0    metFORMIN (Glucophage) 1,000 mg tablet Take 1 tablet (1,000 mg) by mouth 2 times a day with meals. 180 tablet 3    spironolactone (Aldactone) 25 mg tablet Take 1 tablet (25 mg) by mouth 2 times a day. 180 tablet 1     No current facility-administered medications for this visit.       Assessment/Plan     1.  Insulin resistance  2.  Hypertension  3.  Hyperlipidemia  4.  Edema    He will continue his current regimen.    He will work on diet and exercise.  We discussed strategies for success. Planning ahead, taking things a week at a time and planning the week ahead of time.    We will hold on blood work today.    He will watch the carbohydrate intake.    He will follow-up with me in 3 months sooner as needed.        "

## 2024-06-21 DIAGNOSIS — I10 BENIGN ESSENTIAL HYPERTENSION: ICD-10-CM

## 2024-06-21 RX ORDER — LISINOPRIL 20 MG/1
20 TABLET ORAL 2 TIMES DAILY
Qty: 180 TABLET | Refills: 0 | Status: SHIPPED | OUTPATIENT
Start: 2024-06-21

## 2024-06-24 DIAGNOSIS — E88.810 DYSMETABOLIC SYNDROME: Primary | ICD-10-CM

## 2024-06-24 RX ORDER — METFORMIN HYDROCHLORIDE 500 MG/1
1000 TABLET ORAL
Qty: 120 TABLET | Refills: 11 | Status: SHIPPED | OUTPATIENT
Start: 2024-06-24 | End: 2025-06-24

## 2024-07-11 ASSESSMENT — ENCOUNTER SYMPTOMS: HYPERTENSION: 1

## 2024-07-15 ENCOUNTER — APPOINTMENT (OUTPATIENT)
Dept: PRIMARY CARE | Facility: CLINIC | Age: 39
End: 2024-07-15
Payer: COMMERCIAL

## 2024-07-15 VITALS
HEART RATE: 95 BPM | TEMPERATURE: 97.3 F | SYSTOLIC BLOOD PRESSURE: 116 MMHG | BODY MASS INDEX: 57.1 KG/M2 | OXYGEN SATURATION: 97 % | WEIGHT: 315 LBS | DIASTOLIC BLOOD PRESSURE: 81 MMHG

## 2024-07-15 DIAGNOSIS — I10 BENIGN ESSENTIAL HYPERTENSION: Primary | ICD-10-CM

## 2024-07-15 DIAGNOSIS — E88.810 DYSMETABOLIC SYNDROME: ICD-10-CM

## 2024-07-15 DIAGNOSIS — I51.7 CARDIOMEGALY: ICD-10-CM

## 2024-07-15 DIAGNOSIS — R60.0 BILATERAL LOWER EXTREMITY EDEMA: ICD-10-CM

## 2024-07-15 DIAGNOSIS — I51.7 LVH (LEFT VENTRICULAR HYPERTROPHY): ICD-10-CM

## 2024-07-15 DIAGNOSIS — E66.01 MORBID OBESITY (MULTI): ICD-10-CM

## 2024-07-15 PROCEDURE — 3008F BODY MASS INDEX DOCD: CPT | Performed by: NURSE PRACTITIONER

## 2024-07-15 PROCEDURE — 3079F DIAST BP 80-89 MM HG: CPT | Performed by: NURSE PRACTITIONER

## 2024-07-15 PROCEDURE — 1036F TOBACCO NON-USER: CPT | Performed by: NURSE PRACTITIONER

## 2024-07-15 PROCEDURE — 99213 OFFICE O/P EST LOW 20 MIN: CPT | Performed by: NURSE PRACTITIONER

## 2024-07-15 PROCEDURE — 3074F SYST BP LT 130 MM HG: CPT | Performed by: NURSE PRACTITIONER

## 2024-07-15 ASSESSMENT — ENCOUNTER SYMPTOMS
PALPITATIONS: 0
CHEST TIGHTNESS: 0
FATIGUE: 0
ABDOMINAL PAIN: 0
HEADACHES: 0
HYPERTENSION: 1
DIARRHEA: 0
FEVER: 0
CHILLS: 0
SHORTNESS OF BREATH: 0
NAUSEA: 0
COUGH: 0
WEAKNESS: 0
VOMITING: 0
DIZZINESS: 0
NUMBNESS: 0

## 2024-07-15 ASSESSMENT — PATIENT HEALTH QUESTIONNAIRE - PHQ9
2. FEELING DOWN, DEPRESSED OR HOPELESS: NOT AT ALL
SUM OF ALL RESPONSES TO PHQ9 QUESTIONS 1 AND 2: 0
1. LITTLE INTEREST OR PLEASURE IN DOING THINGS: NOT AT ALL

## 2024-07-15 NOTE — PROGRESS NOTES
"Santos Olivas \"Ilya\" is a 38 y.o. male who presents for 6 mon fu.    Hypertension  This is a chronic problem. The current episode started more than 1 month ago. The problem is unchanged. The problem is controlled. Pertinent negatives include no chest pain, headaches, palpitations or shortness of breath. There are no associated agents to hypertension. Risk factors for coronary artery disease include obesity. Compliance problems include exercise.      He presents to the office today for a follow up.  He is tolerating medication well at current dose- no side effects. No chest pain, shortness of breath, palpitations or edema. No headaches, numbness, tingling, weakness or vision changes.  No dizziness.  He reports some pain in the toe of the right toes- no open areas. No redness or warmth.  Tends to happen when feet are elevated.  Home blood pressure readings. 120-130/80-90  Tends to run higher when working.  Diet: Eating a lot less /portion control since starting Metformin  Eating healthier snacks.  Exercise: No scheduled exercise. Mowing lawn every couple of weeks.  Weight: trending down reports about 3 lbs every 1-2 weeks.     Last labs:     Lab Results   Component Value Date    GLUCOSE 90 04/08/2024    CALCIUM 9.7 04/08/2024     04/08/2024    K 4.1 04/08/2024    CO2 27 04/08/2024     04/08/2024    BUN 13 04/08/2024    CREATININE 0.81 04/08/2024      Lab Results   Component Value Date    CHOL 159 01/20/2024     Lab Results   Component Value Date    HDL 41.1 01/20/2024     Lab Results   Component Value Date    LDLCALC 102 (H) 01/20/2024     Lab Results   Component Value Date    TRIG 78 01/20/2024     No components found for: \"CHOLHDL\"     Follows with cardiology every 6 months.  Following with endocrinology every 3 months.    Review of Systems   Constitutional:  Negative for chills, fatigue and fever.   Eyes:  Negative for visual disturbance.   Respiratory:  Negative for cough, chest " "tightness and shortness of breath.    Cardiovascular:  Negative for chest pain, palpitations and leg swelling.   Gastrointestinal:  Negative for abdominal pain, diarrhea, nausea and vomiting.   Neurological:  Negative for dizziness, weakness, numbness and headaches.       Objective   /81 (BP Location: Left arm, Patient Position: Sitting, BP Cuff Size: Thigh) Comment: \"electronic machine in office\"  Pulse 95   Temp 36.3 °C (97.3 °F) (Temporal)   Wt (!) 186 kg (409 lb 6.4 oz)   SpO2 97%   BMI 57.10 kg/m²     Physical Exam  Constitutional:       General: He is not in acute distress.     Appearance: He is obese. He is not toxic-appearing.   Eyes:      Extraocular Movements: Extraocular movements intact.      Conjunctiva/sclera: Conjunctivae normal.      Pupils: Pupils are equal, round, and reactive to light.   Neck:      Vascular: No carotid bruit.   Cardiovascular:      Rate and Rhythm: Normal rate and regular rhythm.      Pulses: Normal pulses.      Heart sounds: Normal heart sounds, S1 normal and S2 normal. No murmur heard.  Pulmonary:      Effort: Pulmonary effort is normal. No respiratory distress.      Breath sounds: Normal breath sounds.   Abdominal:      General: Bowel sounds are normal.      Palpations: Abdomen is soft.      Tenderness: There is no abdominal tenderness.   Musculoskeletal:      Right lower leg: No edema.      Left lower leg: No edema.   Lymphadenopathy:      Cervical: No cervical adenopathy.   Neurological:      Mental Status: He is alert and oriented to person, place, and time.   Psychiatric:         Attention and Perception: Attention normal.         Mood and Affect: Mood and affect normal.         Behavior: Behavior normal. Behavior is cooperative.         Thought Content: Thought content normal.         Cognition and Memory: Cognition normal.         Judgment: Judgment normal.         Assessment/Plan   Problem List Items Addressed This Visit       Benign essential hypertension     " Well controlled today. Home readings overall have also been good.         Morbid obesity (Multi) - Primary     Following with endocrinology to work on weight loss.  Has been trending down on Metformin.         Cardiomegaly     Following with cardiology.         LVH (left ventricular hypertrophy)     Following with cardiology every 6 months.         Dysmetabolic syndrome     Following with endocrinology.         Bilateral lower extremity edema     Improved since off the Amlodipine.            It has been a pleasure seeing you today!

## 2024-09-18 DIAGNOSIS — I10 BENIGN ESSENTIAL HYPERTENSION: ICD-10-CM

## 2024-09-18 DIAGNOSIS — R60.0 LOWER EXTREMITY EDEMA: ICD-10-CM

## 2024-09-20 DIAGNOSIS — I10 BENIGN ESSENTIAL HYPERTENSION: ICD-10-CM

## 2024-09-20 RX ORDER — LISINOPRIL 20 MG/1
20 TABLET ORAL 2 TIMES DAILY
Qty: 180 TABLET | Refills: 1 | Status: SHIPPED | OUTPATIENT
Start: 2024-09-20

## 2024-09-20 RX ORDER — SPIRONOLACTONE 25 MG/1
25 TABLET ORAL 2 TIMES DAILY
Qty: 180 TABLET | Refills: 1 | Status: SHIPPED | OUTPATIENT
Start: 2024-09-20

## 2024-09-23 ENCOUNTER — APPOINTMENT (OUTPATIENT)
Dept: ENDOCRINOLOGY | Facility: CLINIC | Age: 39
End: 2024-09-23
Payer: COMMERCIAL

## 2024-09-24 ENCOUNTER — OFFICE VISIT (OUTPATIENT)
Dept: ENDOCRINOLOGY | Facility: CLINIC | Age: 39
End: 2024-09-24
Payer: COMMERCIAL

## 2024-09-24 VITALS — SYSTOLIC BLOOD PRESSURE: 134 MMHG | WEIGHT: 315 LBS | BODY MASS INDEX: 57.18 KG/M2 | DIASTOLIC BLOOD PRESSURE: 76 MMHG

## 2024-09-24 DIAGNOSIS — E88.810 DYSMETABOLIC SYNDROME: Primary | ICD-10-CM

## 2024-09-24 DIAGNOSIS — I10 BENIGN ESSENTIAL HYPERTENSION: ICD-10-CM

## 2024-09-24 DIAGNOSIS — R60.0 BILATERAL LOWER EXTREMITY EDEMA: ICD-10-CM

## 2024-09-24 PROCEDURE — 3075F SYST BP GE 130 - 139MM HG: CPT | Performed by: INTERNAL MEDICINE

## 2024-09-24 PROCEDURE — 99214 OFFICE O/P EST MOD 30 MIN: CPT | Performed by: INTERNAL MEDICINE

## 2024-09-24 PROCEDURE — 3078F DIAST BP <80 MM HG: CPT | Performed by: INTERNAL MEDICINE

## 2024-09-24 NOTE — PROGRESS NOTES
"Patient ID: Ronal Olivas \"Ilya\" is a 38 y.o. male who presents for Follow-up.  HPI  The patient comes in for follow up.    He has insulin resistance hypertension hyperlipidemia edema GERD and joint pain.    He looked into weight loss medication which was cost prohibitive.    Insurance does not cover PSMF.    We added metformin he is taking 1000 mg twice daily and feels it is helping.    He takes it before he eats and has occasional diarrhea with it.    He has not been consistent with diet.    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    Physical he has no other complaints.    ROS  Comprehensive review of systems is negative.    Objective   Physical Exam  Visit Vitals  /76      Vitals:    09/24/24 0944   Weight: (!) 186 kg (410 lb)      Body mass index is 57.18 kg/m².      Weight 410 down 1 pound for a total of 22    Eyes normal  ENT normal. No adenopathy  Thyroid palpable and normal. No nodules  Chest clear to auscultation  Heart sounds are normal  Abdomen nontender. Bowel sounds normal. No organomegaly  Feet are okay    Current Outpatient Medications   Medication Sig Dispense Refill    lisinopril 20 mg tablet Take 1 tablet (20 mg) by mouth 2 times a day. 180 tablet 1    MAGNESIUM ORAL Take by mouth. \"Sometimes but not often\"      metFORMIN (Glucophage) 500 mg tablet Take 2 tablets (1,000 mg) by mouth 2 times daily (morning and late afternoon). 120 tablet 11    spironolactone (Aldactone) 25 mg tablet TAKE 1 TABLET BY MOUTH TWICE A  tablet 1     No current facility-administered medications for this visit.       Assessment/Plan     1.  Insulin resistance  2.  Hypertension  3.  Hyperlipidemia    He will continue his current regimen.    We again discussed weight loss medications but they are cost prohibitive.    He will work on carbohydrate intake and exercise as best he can.    He will follow-up with me in 6 months sooner as needed.        "

## 2024-11-09 ENCOUNTER — LAB (OUTPATIENT)
Dept: LAB | Facility: LAB | Age: 39
End: 2024-11-09
Payer: COMMERCIAL

## 2024-11-09 DIAGNOSIS — E66.01 MORBID OBESITY (MULTI): ICD-10-CM

## 2024-11-09 DIAGNOSIS — I51.7 LVH (LEFT VENTRICULAR HYPERTROPHY): ICD-10-CM

## 2024-11-09 DIAGNOSIS — I51.7 RIGHT VENTRICULAR DILATION: ICD-10-CM

## 2024-11-09 DIAGNOSIS — I10 BENIGN ESSENTIAL HYPERTENSION: ICD-10-CM

## 2024-11-09 DIAGNOSIS — R60.0 BILATERAL LOWER EXTREMITY EDEMA: ICD-10-CM

## 2024-11-09 LAB
ANION GAP SERPL CALC-SCNC: 12 MMOL/L (ref 10–20)
BNP SERPL-MCNC: 19 PG/ML (ref 0–99)
BUN SERPL-MCNC: 13 MG/DL (ref 6–23)
CALCIUM SERPL-MCNC: 8.9 MG/DL (ref 8.6–10.3)
CHLORIDE SERPL-SCNC: 105 MMOL/L (ref 98–107)
CO2 SERPL-SCNC: 27 MMOL/L (ref 21–32)
CREAT SERPL-MCNC: 0.79 MG/DL (ref 0.5–1.3)
EGFRCR SERPLBLD CKD-EPI 2021: >90 ML/MIN/1.73M*2
GLUCOSE SERPL-MCNC: 97 MG/DL (ref 74–99)
MAGNESIUM SERPL-MCNC: 2.04 MG/DL (ref 1.6–2.4)
POTASSIUM SERPL-SCNC: 4.8 MMOL/L (ref 3.5–5.3)
SODIUM SERPL-SCNC: 139 MMOL/L (ref 136–145)

## 2024-11-09 PROCEDURE — 83735 ASSAY OF MAGNESIUM: CPT

## 2024-11-09 PROCEDURE — 80048 BASIC METABOLIC PNL TOTAL CA: CPT

## 2024-11-09 PROCEDURE — 36415 COLL VENOUS BLD VENIPUNCTURE: CPT

## 2024-11-09 PROCEDURE — 83880 ASSAY OF NATRIURETIC PEPTIDE: CPT

## 2024-11-11 ENCOUNTER — APPOINTMENT (OUTPATIENT)
Dept: CARDIOLOGY | Facility: CLINIC | Age: 39
End: 2024-11-11
Payer: COMMERCIAL

## 2024-11-11 VITALS
DIASTOLIC BLOOD PRESSURE: 82 MMHG | HEIGHT: 71 IN | OXYGEN SATURATION: 98 % | HEART RATE: 81 BPM | BODY MASS INDEX: 44.1 KG/M2 | SYSTOLIC BLOOD PRESSURE: 124 MMHG | WEIGHT: 315 LBS

## 2024-11-11 DIAGNOSIS — I51.7 RIGHT VENTRICULAR DILATION: ICD-10-CM

## 2024-11-11 DIAGNOSIS — I10 BENIGN ESSENTIAL HYPERTENSION: Primary | ICD-10-CM

## 2024-11-11 DIAGNOSIS — R60.0 BILATERAL LOWER EXTREMITY EDEMA: ICD-10-CM

## 2024-11-11 DIAGNOSIS — E66.01 MORBID OBESITY (MULTI): ICD-10-CM

## 2024-11-11 DIAGNOSIS — I51.7 LVH (LEFT VENTRICULAR HYPERTROPHY): ICD-10-CM

## 2024-11-11 PROCEDURE — 99213 OFFICE O/P EST LOW 20 MIN: CPT | Performed by: PHYSICIAN ASSISTANT

## 2024-11-11 PROCEDURE — 3079F DIAST BP 80-89 MM HG: CPT | Performed by: PHYSICIAN ASSISTANT

## 2024-11-11 PROCEDURE — 3008F BODY MASS INDEX DOCD: CPT | Performed by: PHYSICIAN ASSISTANT

## 2024-11-11 PROCEDURE — 3074F SYST BP LT 130 MM HG: CPT | Performed by: PHYSICIAN ASSISTANT

## 2024-11-11 PROCEDURE — 1036F TOBACCO NON-USER: CPT | Performed by: PHYSICIAN ASSISTANT

## 2024-11-11 ASSESSMENT — ENCOUNTER SYMPTOMS
DYSURIA: 0
FEVER: 0
ABDOMINAL PAIN: 0
DIARRHEA: 0
SHORTNESS OF BREATH: 0
WHEEZING: 0
WEIGHT GAIN: 1
VOMITING: 0
NAUSEA: 0
ORTHOPNEA: 0
PALPITATIONS: 0
WEAKNESS: 0

## 2024-11-11 NOTE — PROGRESS NOTES
Cardiology Follow Up  Chief Complaint:   Patient is here for 6 month office visit.      History Of Present Illness:    This is a 38-year-old male here for follow-up regarding right ventricular dilatation, LVH, hypertension, morbid obesity, and lower extremity edema.  The patient was last evaluated by me in March 2024.  At that visit I ordered a coronary calcium score, suggested a sleep study which the patient declined, and asked the patient to follow-up in 6 weeks.  Patient subsequently called the cardiology office reporting cold toes on his right foot as well as pain behind his left knee as well as lower extremity edema which he believes was medication related.  Subsequently discontinued amlodipine and started spironolactone 25 mg twice daily with some improvement of his symptoms.  BMP done 4/8/2024 showed normal serum sodium and potassium with a serum creatinine of 0.81, serum magnesium was 2.33. CT cardiac scoring done in April 2024 showed coronary artery calcium score of 0.  ECG done today showed sinus rhythm with a heart rate of 79 bpm.  The patient reports that his lower extremity edema had improved since he started spironolactone. He denies any new chest pain, shortness of breath, palpitations and lightheadedness. He also reports intermittent muscle tightness on his legs for which he had been taking magnesium. He states that his blood pressure at home is usually within the 120-130s range. He states that his blood pressure elevations are likely due to stress. He states that he takes all of his medications as prescribed. During my exam, he was resting comfortably on the exam table.   11-11-24: Is a pleasant 39-year-old patient known to Dr. Quarles.  Above history as noted.  Presents for 6-month follow-up.  Patient has not really had much success in the way of weight loss in the last 6 months.  He has been checking the blood pressure log with good control of his blood pressures on lisinopril and spironolactone.  He  "takes metformin but has not been able to afford any injectable medication such as Wegovy/Ozempic for weight loss.  Denies chest pain palpitations or feeling short of breath.  Not clear as to whether or not the patient has obstructive sleep apnea but likely should be screened by his PCP.           Last Recorded Vitals:  Vitals:    11/11/24 1239   BP: 124/82   BP Location: Left arm   Patient Position: Sitting   BP Cuff Size: Adult   Pulse: 81   SpO2: 98%   Weight: (!) 188 kg (414 lb)   Height: 1.803 m (5' 11\")       Past Medical History:  He has a past medical history of ADHD (attention deficit hyperactivity disorder), Headache, Hypertension, and Seizures (Multi).    Past Surgical History:  He has a past surgical history that includes No past surgeries.      Social History:  He reports that he has never smoked. He has never been exposed to tobacco smoke. He has never used smokeless tobacco. He reports that he does not currently use alcohol. He reports that he does not use drugs.    Family History:  Family History   Problem Relation Name Age of Onset    No Known Problems Mother      No Known Problems Father      Hypertension Maternal Grandfather Champion         Allergies:  Shellfish containing products and Dilantin [phenytoin sodium extended]    Outpatient Medications:  Current Outpatient Medications   Medication Instructions    lisinopril 20 mg, oral, 2 times daily    MAGNESIUM ORAL Take by mouth. \"Sometimes but not often\"    metFORMIN (GLUCOPHAGE) 1,000 mg, oral, 2 times daily (morning and late afternoon)    spironolactone (ALDACTONE) 25 mg, oral, 2 times daily     Review of Systems   Constitutional: Positive for weight gain. Negative for fever and malaise/fatigue.   Cardiovascular:  Negative for chest pain, orthopnea and palpitations.   Respiratory:  Negative for shortness of breath and wheezing.    Skin:  Negative for itching and rash.        Dry skin mostly R hand   Gastrointestinal:  Negative for abdominal pain, " diarrhea, nausea and vomiting.   Genitourinary:  Negative for dysuria.   Neurological:  Negative for weakness.      Physical Exam  Constitutional:       General: He is not in acute distress.     Appearance: Normal appearance. He is obese.   HENT:      Mouth/Throat:      Mouth: Mucous membranes are moist.   Neck:      Comments: No obvious JVD  Cardiovascular:      Rate and Rhythm: Normal rate and regular rhythm.      Heart sounds: Normal heart sounds. No murmur heard.  Pulmonary:      Effort: Pulmonary effort is normal.      Breath sounds: Normal breath sounds.   Abdominal:      General: Abdomen is flat. Bowel sounds are normal.      Palpations: Abdomen is soft.   Musculoskeletal:      Comments: Mild edema   Skin:     General: Skin is warm and dry.   Neurological:      Mental Status: He is alert and oriented to person, place, and time.   Psychiatric:         Mood and Affect: Mood normal.           Last Labs:  CBC -  Lab Results   Component Value Date    WBC 10.0 08/20/2023    HGB 15.8 08/20/2023    HCT 47.4 08/20/2023    MCV 87 08/20/2023     08/20/2023       CMP -  Lab Results   Component Value Date    CALCIUM 8.9 11/09/2024    PROT 7.7 08/20/2023    ALBUMIN 4.2 08/20/2023    AST 21 08/20/2023    ALT 22 08/20/2023    ALKPHOS 92 08/20/2023    BILITOT 0.4 08/20/2023       LIPID PANEL -   Lab Results   Component Value Date    CHOL 159 01/20/2024    TRIG 78 01/20/2024    HDL 41.1 01/20/2024    CHHDL 3.9 01/20/2024    VLDL 16 01/20/2024    NHDL 118 01/20/2024       RENAL FUNCTION PANEL -   Lab Results   Component Value Date    GLUCOSE 97 11/09/2024     11/09/2024    K 4.8 11/09/2024     11/09/2024    CO2 27 11/09/2024    ANIONGAP 12 11/09/2024    BUN 13 11/09/2024    CREATININE 0.79 11/09/2024    GFRMALE >90 09/23/2023    CALCIUM 8.9 11/09/2024    ALBUMIN 4.2 08/20/2023        Lab Results   Component Value Date    BNP 19 11/09/2024       Last Cardiology Tests:    Echo:  Transthoracic Echo (TTE)  Complete 01/29/2024--CONCLUSIONS:   1. Left ventricular systolic function is normal with a 60-65% estimated ejection fraction.   2. Mildly increased left ventricular septal thickness.   3. Moderately enlarged right ventricle.   4. The left atrium is moderately dilated.  Ejection Fractions:  EF   Date/Time Value Ref Range Status   01/29/2024 07:44 AM 65 %    Cardiac Imaging:  CT cardiac scoring wo IV contrast 04/14/2024--IMPRESSION:  Coronary artery calcium score of 0*.      Lab review: I have personally reviewed the laboratory result(s).    Assessment/Plan   Problem List Items Addressed This Visit             ICD-10-CM       Cardiac and Vasculature    Benign essential hypertension - Primary I10    Right ventricular dilation I51.7    LVH (left ventricular hypertrophy) I51.7       Endocrine/Metabolic    Morbid obesity (Multi) E66.01       Symptoms and Signs    Bilateral lower extremity edema R60.0   Hypertension--patient is kept a good log of his blood pressure showing fairly well controlled blood pressures mostly in the 120 range.  For now continue lisinopril and spironolactone.  Denies need for any medication refills.  Recent labs showed normal potassium and renal function.  RV dilatation--again this raises the concern for possibility of obstructive sleep apnea and he should have a sleep study arranged by his PCP.  Obesity--briefly discussed the benefits of weight loss.  Fortunately his lipids are very well-controlled and his calcium score in the past has been 0.  Lower extremity edema--occupation is  so he is seated most of the day and states that he had been on a more potent diuretic but it caused a lot of muscle pain and spasms.  Seems to be tolerating lisinopril and spironolactone well.  Overall patient has no new cardiac complaints or concerns.  He will continue the regimen and will arrange follow-up with Dr. Quarles in 6 months time.      Sumanth Martinez PA-C  11/11/2024  12:43 PM

## 2025-01-20 ENCOUNTER — APPOINTMENT (OUTPATIENT)
Dept: PRIMARY CARE | Facility: CLINIC | Age: 40
End: 2025-01-20
Payer: COMMERCIAL

## 2025-03-13 DIAGNOSIS — I10 BENIGN ESSENTIAL HYPERTENSION: ICD-10-CM

## 2025-03-13 DIAGNOSIS — R60.0 LOWER EXTREMITY EDEMA: ICD-10-CM

## 2025-03-13 RX ORDER — SPIRONOLACTONE 25 MG/1
25 TABLET ORAL 2 TIMES DAILY
Qty: 180 TABLET | Refills: 0 | Status: SHIPPED | OUTPATIENT
Start: 2025-03-13

## 2025-03-24 ENCOUNTER — APPOINTMENT (OUTPATIENT)
Dept: ENDOCRINOLOGY | Facility: CLINIC | Age: 40
End: 2025-03-24
Payer: COMMERCIAL

## 2025-03-24 VITALS — WEIGHT: 315 LBS | DIASTOLIC BLOOD PRESSURE: 74 MMHG | BODY MASS INDEX: 54.39 KG/M2 | SYSTOLIC BLOOD PRESSURE: 128 MMHG

## 2025-03-24 DIAGNOSIS — I10 BENIGN ESSENTIAL HYPERTENSION: ICD-10-CM

## 2025-03-24 DIAGNOSIS — R60.0 BILATERAL LOWER EXTREMITY EDEMA: ICD-10-CM

## 2025-03-24 DIAGNOSIS — E88.810 DYSMETABOLIC SYNDROME: Primary | ICD-10-CM

## 2025-03-24 PROCEDURE — 3074F SYST BP LT 130 MM HG: CPT | Performed by: INTERNAL MEDICINE

## 2025-03-24 PROCEDURE — 99214 OFFICE O/P EST MOD 30 MIN: CPT | Performed by: INTERNAL MEDICINE

## 2025-03-24 PROCEDURE — 3078F DIAST BP <80 MM HG: CPT | Performed by: INTERNAL MEDICINE

## 2025-03-24 NOTE — PROGRESS NOTES
"Patient ID: Ronal Olivas \"Ilya\" is a 39 y.o. male who presents for Follow-up.  HPI  The patient comes in for follow up.    He has insulin resistance hypertension hyperlipidemia edema GERD and joint pain.    He looked into weight loss medication which was cost prohibitive.    Insurance does not cover PSMF.    We added metformin he is taking 1000 mg twice daily and feels it is helping.    He had an over-the-counter omega-3 which he feels has helped with his appetite but does give him some diarrhea.    He has not been consistent with diet.    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    Physical he has no other complaints.    ROS  Comprehensive review of systems is negative.    Objective   Physical Exam  There were no vitals taken for this visit.   Vitals:    03/24/25 0941   Weight: (!) 177 kg (390 lb)      Body mass index is 54.39 kg/m².      Weight 390 down 20 pounds for a total of 42    Eyes normal  ENT normal. No adenopathy  Thyroid palpable and normal. No nodules  Chest clear to auscultation  Heart sounds are normal  Abdomen nontender. Bowel sounds normal. No organomegaly  Feet are okay    Current Outpatient Medications   Medication Sig Dispense Refill    lisinopril 20 mg tablet Take 1 tablet (20 mg) by mouth 2 times a day. 180 tablet 0    MAGNESIUM ORAL Take by mouth. \"Sometimes but not often\"      metFORMIN (Glucophage) 500 mg tablet Take 2 tablets (1,000 mg) by mouth 2 times daily (morning and late afternoon). 120 tablet 11    spironolactone (Aldactone) 25 mg tablet TAKE 1 TABLET BY MOUTH TWICE A  tablet 0     No current facility-administered medications for this visit.       Assessment/Plan     1.  Hypertension  2.  Hyperlipidemia  3.  Insulin resistance    He will continue his current regimen.    He will work on diet and exercise.  We discussed strategies for success. Planning ahead, taking things a week at a time " and planning the week ahead of time.    He will work to tighten up his diet.    We will hold on blood work today.    He will follow-up with me in 6 months sooner as needed.

## 2025-03-25 ENCOUNTER — APPOINTMENT (OUTPATIENT)
Dept: PRIMARY CARE | Facility: CLINIC | Age: 40
End: 2025-03-25
Payer: COMMERCIAL

## 2025-03-25 VITALS
OXYGEN SATURATION: 96 % | BODY MASS INDEX: 54.62 KG/M2 | SYSTOLIC BLOOD PRESSURE: 117 MMHG | DIASTOLIC BLOOD PRESSURE: 80 MMHG | HEART RATE: 83 BPM | WEIGHT: 315 LBS | TEMPERATURE: 97.9 F

## 2025-03-25 DIAGNOSIS — I51.7 LVH (LEFT VENTRICULAR HYPERTROPHY): ICD-10-CM

## 2025-03-25 DIAGNOSIS — E66.01 MORBID OBESITY (MULTI): ICD-10-CM

## 2025-03-25 DIAGNOSIS — I10 BENIGN ESSENTIAL HYPERTENSION: Primary | ICD-10-CM

## 2025-03-25 PROCEDURE — 3079F DIAST BP 80-89 MM HG: CPT | Performed by: NURSE PRACTITIONER

## 2025-03-25 PROCEDURE — 3074F SYST BP LT 130 MM HG: CPT | Performed by: NURSE PRACTITIONER

## 2025-03-25 PROCEDURE — 1036F TOBACCO NON-USER: CPT | Performed by: NURSE PRACTITIONER

## 2025-03-25 PROCEDURE — 99213 OFFICE O/P EST LOW 20 MIN: CPT | Performed by: NURSE PRACTITIONER

## 2025-03-25 ASSESSMENT — ENCOUNTER SYMPTOMS
ABDOMINAL PAIN: 0
CHEST TIGHTNESS: 0
CHILLS: 0
COUGH: 0
FATIGUE: 0
HEADACHES: 0
WEAKNESS: 0
VOMITING: 0
DIZZINESS: 0
SHORTNESS OF BREATH: 0
DIARRHEA: 1
PALPITATIONS: 0
FEVER: 0
NAUSEA: 0
NUMBNESS: 0

## 2025-03-25 ASSESSMENT — PATIENT HEALTH QUESTIONNAIRE - PHQ9
SUM OF ALL RESPONSES TO PHQ9 QUESTIONS 1 AND 2: 0
2. FEELING DOWN, DEPRESSED OR HOPELESS: NOT AT ALL
1. LITTLE INTEREST OR PLEASURE IN DOING THINGS: NOT AT ALL

## 2025-03-25 NOTE — PATIENT INSTRUCTIONS
Continue to focus on a healthy diet and exercise.  Continue medications at the current dose.  Follow up in 6 months.

## 2025-03-25 NOTE — PROGRESS NOTES
"Santos Olivas \"Ilya\" is a 39 y.o. male who presents for 6 mon fu.    HPI  He presents to the office today for medication refills and follow up. Got  in September and moved to [facility].  He is tolerating medications well at current dose- no side effects. He reports he has sore feet- feels it is from the blood pressure medication. No chest pain, shortness of breath, palpitations or edema. No headaches, numbness, tingling, weakness or vision changes.  He does have diarrhea at times which he feels is due to the Metformin and the supplement he is taking.   No dizziness.  Home blood pressure readings:115-120/70-80  Last eye exam: has had one in the past couple years.  Diet: Overall eating smaller portions.  Drinking plenty of fluids- adds sugar free flavors.  Exercise: Has a new job- lifting and moving-much more active with new job  Weight: Weight is trending down  Max weight 430 lbs- down about 38 lbs   He continues to follow with endocrinology for weight loss management.    He reports he is following with cardiology every 6 months    Last labs: Reviewed labs (BMP) from 11/2024  Lab Results   Component Value Date    GLUCOSE 97 11/09/2024    CALCIUM 8.9 11/09/2024     11/09/2024    K 4.8 11/09/2024    CO2 27 11/09/2024     11/09/2024    BUN 13 11/09/2024    CREATININE 0.79 11/09/2024        Review of Systems   Constitutional:  Negative for chills, fatigue and fever.   Eyes:  Negative for visual disturbance.   Respiratory:  Negative for cough, chest tightness and shortness of breath.    Cardiovascular:  Negative for chest pain, palpitations and leg swelling.   Gastrointestinal:  Positive for diarrhea. Negative for abdominal pain, nausea and vomiting.   Neurological:  Negative for dizziness, weakness, numbness and headaches.       Objective   /80 (BP Location: Left arm, Patient Position: Sitting) Comment: electronic BP machine in the office  Pulse 83   Temp 36.6 °C (97.9 " °F) (Temporal)   Wt (!) 178 kg (391 lb 9.6 oz)   SpO2 96%   BMI 54.62 kg/m²     Physical Exam  Constitutional:       General: He is not in acute distress.     Appearance: Normal appearance. He is not toxic-appearing.   Eyes:      Extraocular Movements: Extraocular movements intact.      Conjunctiva/sclera: Conjunctivae normal.      Pupils: Pupils are equal, round, and reactive to light.   Cardiovascular:      Rate and Rhythm: Normal rate and regular rhythm.      Pulses: Normal pulses.      Heart sounds: Normal heart sounds, S1 normal and S2 normal. No murmur heard.  Pulmonary:      Effort: Pulmonary effort is normal. No respiratory distress.      Breath sounds: Normal breath sounds.   Abdominal:      General: Bowel sounds are normal.      Palpations: Abdomen is soft.      Tenderness: There is no abdominal tenderness.   Musculoskeletal:      Right lower leg: No edema.      Left lower leg: No edema.   Lymphadenopathy:      Cervical: No cervical adenopathy.   Neurological:      Mental Status: He is alert and oriented to person, place, and time.   Psychiatric:         Attention and Perception: Attention normal.         Mood and Affect: Mood and affect normal.         Behavior: Behavior normal. Behavior is cooperative.         Thought Content: Thought content normal.         Cognition and Memory: Cognition normal.         Judgment: Judgment normal.         Assessment/Plan   Problem List Items Addressed This Visit       Benign essential hypertension - Primary     Well controlled on current medications. Continue.         Morbid obesity (Multi)     He is following with endocrinology for weight loss. Weight is down about 38 lbs.         LVH (left ventricular hypertrophy)     He is following with cardiology every 6 months.           It has been a pleasure seeing you today!

## 2025-03-28 DIAGNOSIS — E88.810 DYSMETABOLIC SYNDROME: Primary | ICD-10-CM

## 2025-03-28 NOTE — PROGRESS NOTES
"Patient ID: Ronal Olivas \"Ilya\" is a 39 y.o. male who presents for No chief complaint on file..  HPI  ***    ROS  Comprehensive review of systems is negative.    Objective   Physical Exam  There were no vitals taken for this visit.   There were no vitals filed for this visit.   There is no height or weight on file to calculate BMI.      ***    Current Outpatient Medications   Medication Sig Dispense Refill    lisinopril 20 mg tablet Take 1 tablet (20 mg) by mouth 2 times a day. 180 tablet 0    MAGNESIUM ORAL Take by mouth. \"Sometimes but not often\"      metFORMIN (Glucophage) 500 mg tablet Take 2 tablets (1,000 mg) by mouth 2 times daily (morning and late afternoon). 120 tablet 11    NON FORMULARY Minh Inchi      spironolactone (Aldactone) 25 mg tablet TAKE 1 TABLET BY MOUTH TWICE A  tablet 0     No current facility-administered medications for this visit.       Assessment/Plan   ***        "

## 2025-05-07 DIAGNOSIS — R60.0 LOWER EXTREMITY EDEMA: ICD-10-CM

## 2025-05-07 DIAGNOSIS — I10 BENIGN ESSENTIAL HYPERTENSION: ICD-10-CM

## 2025-05-07 RX ORDER — SPIRONOLACTONE 25 MG/1
25 TABLET ORAL 2 TIMES DAILY
Qty: 180 TABLET | Refills: 1 | Status: SHIPPED | OUTPATIENT
Start: 2025-05-07

## 2025-05-12 ENCOUNTER — OFFICE VISIT (OUTPATIENT)
Dept: CARDIOLOGY | Facility: HOSPITAL | Age: 40
End: 2025-05-12
Payer: COMMERCIAL

## 2025-05-12 VITALS
SYSTOLIC BLOOD PRESSURE: 126 MMHG | WEIGHT: 315 LBS | BODY MASS INDEX: 44.1 KG/M2 | HEART RATE: 90 BPM | DIASTOLIC BLOOD PRESSURE: 86 MMHG | HEIGHT: 71 IN

## 2025-05-12 DIAGNOSIS — E66.01 MORBID OBESITY (MULTI): ICD-10-CM

## 2025-05-12 DIAGNOSIS — I10 BENIGN ESSENTIAL HYPERTENSION: ICD-10-CM

## 2025-05-12 DIAGNOSIS — I51.7 LVH (LEFT VENTRICULAR HYPERTROPHY): ICD-10-CM

## 2025-05-12 DIAGNOSIS — I51.7 RIGHT VENTRICULAR DILATION: Primary | ICD-10-CM

## 2025-05-12 LAB
ATRIAL RATE: 90 BPM
P AXIS: 50 DEGREES
P OFFSET: 206 MS
P ONSET: 144 MS
PR INTERVAL: 154 MS
Q ONSET: 221 MS
QRS COUNT: 14 BEATS
QRS DURATION: 78 MS
QT INTERVAL: 328 MS
QTC CALCULATION(BAZETT): 401 MS
QTC FREDERICIA: 375 MS
R AXIS: 18 DEGREES
T AXIS: 39 DEGREES
T OFFSET: 385 MS
VENTRICULAR RATE: 90 BPM

## 2025-05-12 PROCEDURE — 3079F DIAST BP 80-89 MM HG: CPT | Performed by: INTERNAL MEDICINE

## 2025-05-12 PROCEDURE — 3074F SYST BP LT 130 MM HG: CPT | Performed by: INTERNAL MEDICINE

## 2025-05-12 PROCEDURE — 1036F TOBACCO NON-USER: CPT | Performed by: INTERNAL MEDICINE

## 2025-05-12 PROCEDURE — 3008F BODY MASS INDEX DOCD: CPT | Performed by: INTERNAL MEDICINE

## 2025-05-12 PROCEDURE — 99213 OFFICE O/P EST LOW 20 MIN: CPT | Mod: 25 | Performed by: INTERNAL MEDICINE

## 2025-05-12 PROCEDURE — 93010 ELECTROCARDIOGRAM REPORT: CPT | Performed by: INTERNAL MEDICINE

## 2025-05-12 PROCEDURE — 99213 OFFICE O/P EST LOW 20 MIN: CPT | Performed by: INTERNAL MEDICINE

## 2025-05-12 PROCEDURE — 93005 ELECTROCARDIOGRAM TRACING: CPT | Performed by: INTERNAL MEDICINE

## 2025-05-13 LAB
ANION GAP SERPL CALCULATED.4IONS-SCNC: 11 MMOL/L (CALC) (ref 7–17)
BUN SERPL-MCNC: 22 MG/DL (ref 7–25)
BUN/CREAT SERPL: NORMAL (CALC) (ref 6–22)
CALCIUM SERPL-MCNC: 9.6 MG/DL (ref 8.6–10.3)
CHLORIDE SERPL-SCNC: 103 MMOL/L (ref 98–110)
CO2 SERPL-SCNC: 24 MMOL/L (ref 20–32)
CREAT SERPL-MCNC: 1.07 MG/DL (ref 0.6–1.29)
EGFRCR SERPLBLD CKD-EPI 2021: 89 ML/MIN/1.73M2
GLUCOSE SERPL-MCNC: 91 MG/DL (ref 65–139)
MAGNESIUM SERPL-MCNC: 2.2 MG/DL (ref 1.5–2.5)
POTASSIUM SERPL-SCNC: 4.8 MMOL/L (ref 3.5–5.3)
SODIUM SERPL-SCNC: 138 MMOL/L (ref 135–146)

## 2025-05-30 DIAGNOSIS — E88.810 DYSMETABOLIC SYNDROME: ICD-10-CM

## 2025-05-30 DIAGNOSIS — I51.7 CARDIOMEGALY: ICD-10-CM

## 2025-05-30 DIAGNOSIS — I51.7 LVH (LEFT VENTRICULAR HYPERTROPHY): Primary | ICD-10-CM

## 2025-05-30 NOTE — PROGRESS NOTES
"Patient ID: Ronal Olivas \"Ilya\" is a 39 y.o. male who presents for No chief complaint on file..  HPI  ***    ROS  Comprehensive review of systems is negative.    Objective   Physical Exam  There were no vitals taken for this visit.   There were no vitals filed for this visit.   There is no height or weight on file to calculate BMI.      ***    Current Medications[1]    Assessment/Plan   ***             [1]   Current Outpatient Medications   Medication Sig Dispense Refill    lisinopril 20 mg tablet Take 1 tablet (20 mg) by mouth 2 times a day. 180 tablet 0    MAGNESIUM ORAL Take by mouth. \"Sometimes but not often\"      metFORMIN (Glucophage) 500 mg tablet Take 2 tablets (1,000 mg) by mouth 2 times daily (morning and late afternoon). 120 tablet 11    NON FORMULARY Minh Inchi      semaglutide 0.25 mg or 0.5 mg (2 mg/3 mL) pen injector Inject 0.25 mg under the skin every 7 days. Inject 0.25 mg weekly for 4 weeks then increase to 0.5 mg. 3 mL 2    spironolactone (Aldactone) 25 mg tablet TAKE 1 TABLET BY MOUTH TWICE A  tablet 1     No current facility-administered medications for this visit.     "

## 2025-06-04 DIAGNOSIS — I10 BENIGN ESSENTIAL HYPERTENSION: ICD-10-CM

## 2025-06-04 RX ORDER — LISINOPRIL 20 MG/1
40 TABLET ORAL DAILY
Qty: 60 TABLET | Refills: 2 | Status: SHIPPED | OUTPATIENT
Start: 2025-06-04

## 2025-06-17 DIAGNOSIS — E88.810 DYSMETABOLIC SYNDROME: ICD-10-CM

## 2025-07-02 DIAGNOSIS — E88.810 DYSMETABOLIC SYNDROME: ICD-10-CM

## 2025-07-02 RX ORDER — METFORMIN HYDROCHLORIDE 500 MG/1
TABLET ORAL
Qty: 360 TABLET | Refills: 2 | Status: SHIPPED | OUTPATIENT
Start: 2025-07-02

## 2025-09-24 ENCOUNTER — APPOINTMENT (OUTPATIENT)
Dept: ENDOCRINOLOGY | Facility: CLINIC | Age: 40
End: 2025-09-24
Payer: COMMERCIAL

## 2025-09-30 ENCOUNTER — APPOINTMENT (OUTPATIENT)
Dept: PRIMARY CARE | Facility: CLINIC | Age: 40
End: 2025-09-30
Payer: COMMERCIAL